# Patient Record
Sex: MALE | Race: OTHER | Employment: FULL TIME
[De-identification: names, ages, dates, MRNs, and addresses within clinical notes are randomized per-mention and may not be internally consistent; named-entity substitution may affect disease eponyms.]

---

## 2017-01-10 ENCOUNTER — OFFICE VISIT (OUTPATIENT)
Dept: FAMILY MEDICINE CLINIC | Facility: CLINIC | Age: 52
End: 2017-01-10

## 2017-01-10 VITALS
WEIGHT: 176.8 LBS | HEART RATE: 60 BPM | RESPIRATION RATE: 18 BRPM | TEMPERATURE: 98.5 F | SYSTOLIC BLOOD PRESSURE: 102 MMHG | BODY MASS INDEX: 32.34 KG/M2 | DIASTOLIC BLOOD PRESSURE: 70 MMHG

## 2017-01-10 DIAGNOSIS — E11.9 TYPE 2 DIABETES MELLITUS WITHOUT COMPLICATION, WITHOUT LONG-TERM CURRENT USE OF INSULIN (HCC): ICD-10-CM

## 2017-01-10 DIAGNOSIS — B34.9 VIRAL ILLNESS: Primary | ICD-10-CM

## 2017-01-10 DIAGNOSIS — J02.9 SORE THROAT: ICD-10-CM

## 2017-01-10 DIAGNOSIS — R05.9 COUGH: ICD-10-CM

## 2017-01-10 DIAGNOSIS — R09.81 CONGESTION OF NASAL SINUS: ICD-10-CM

## 2017-01-10 LAB — S PYO AG THROAT QL: NORMAL

## 2017-01-10 PROCEDURE — 99213 OFFICE O/P EST LOW 20 MIN: CPT | Performed by: FAMILY MEDICINE

## 2017-01-10 PROCEDURE — 87880 STREP A ASSAY W/OPTIC: CPT | Performed by: FAMILY MEDICINE

## 2017-01-10 RX ORDER — MELOXICAM 15 MG/1
TABLET ORAL
Refills: 1 | COMMUNITY
Start: 2016-12-20 | End: 2017-07-21 | Stop reason: ALTCHOICE

## 2017-01-10 RX ORDER — FLUTICASONE PROPIONATE 50 MCG
SPRAY, SUSPENSION (ML) NASAL
Qty: 1 BOTTLE | Refills: 0 | Status: SHIPPED | OUTPATIENT
Start: 2017-01-10 | End: 2018-08-14 | Stop reason: ALTCHOICE

## 2017-01-10 RX ORDER — ATORVASTATIN CALCIUM 10 MG/1
TABLET, FILM COATED ORAL
Qty: 30 TABLET | Refills: 3 | Status: SHIPPED | OUTPATIENT
Start: 2017-01-10 | End: 2017-05-03 | Stop reason: SDUPTHER

## 2017-01-10 RX ORDER — BENZONATATE 100 MG/1
CAPSULE ORAL
Qty: 30 CAPSULE | Refills: 1 | Status: SHIPPED | OUTPATIENT
Start: 2017-01-10 | End: 2017-07-21 | Stop reason: ALTCHOICE

## 2017-01-10 ASSESSMENT — ENCOUNTER SYMPTOMS
CHEST TIGHTNESS: 0
SORE THROAT: 1
COUGH: 1
RHINORRHEA: 1
SHORTNESS OF BREATH: 0
ABDOMINAL PAIN: 0

## 2017-02-03 ENCOUNTER — TELEPHONE (OUTPATIENT)
Dept: ORTHOPEDIC SURGERY | Facility: CLINIC | Age: 52
End: 2017-02-03

## 2017-02-07 RX ORDER — METHYLPREDNISOLONE 4 MG/1
TABLET ORAL
Qty: 1 KIT | Refills: 0 | Status: SHIPPED | OUTPATIENT
Start: 2017-02-07 | End: 2017-07-21 | Stop reason: ALTCHOICE

## 2017-03-08 ENCOUNTER — OFFICE VISIT (OUTPATIENT)
Dept: ORTHOPEDIC SURGERY | Facility: CLINIC | Age: 52
End: 2017-03-08

## 2017-03-08 DIAGNOSIS — G89.29 CHRONIC RIGHT SHOULDER PAIN: Primary | ICD-10-CM

## 2017-03-08 DIAGNOSIS — M25.511 CHRONIC RIGHT SHOULDER PAIN: Primary | ICD-10-CM

## 2017-03-08 DIAGNOSIS — M25.512 LEFT SHOULDER PAIN, UNSPECIFIED CHRONICITY: ICD-10-CM

## 2017-03-08 PROCEDURE — 99213 OFFICE O/P EST LOW 20 MIN: CPT | Performed by: ORTHOPAEDIC SURGERY

## 2017-03-08 PROCEDURE — 20610 DRAIN/INJ JOINT/BURSA W/O US: CPT | Performed by: ORTHOPAEDIC SURGERY

## 2017-03-08 RX ORDER — BETAMETHASONE SODIUM PHOSPHATE AND BETAMETHASONE ACETATE 3; 3 MG/ML; MG/ML
12 INJECTION, SUSPENSION INTRA-ARTICULAR; INTRALESIONAL; INTRAMUSCULAR; SOFT TISSUE ONCE
Status: COMPLETED | OUTPATIENT
Start: 2017-03-08 | End: 2017-03-08

## 2017-03-08 RX ORDER — BUPIVACAINE HYDROCHLORIDE 5 MG/ML
2 INJECTION, SOLUTION PERINEURAL ONCE
Status: COMPLETED | OUTPATIENT
Start: 2017-03-08 | End: 2017-03-08

## 2017-03-08 RX ORDER — LIDOCAINE HYDROCHLORIDE 10 MG/ML
2 INJECTION, SOLUTION EPIDURAL; INFILTRATION; INTRACAUDAL; PERINEURAL ONCE
Status: COMPLETED | OUTPATIENT
Start: 2017-03-08 | End: 2017-03-08

## 2017-03-08 RX ADMIN — BUPIVACAINE HYDROCHLORIDE 10 MG: 5 INJECTION, SOLUTION PERINEURAL at 16:20

## 2017-03-08 RX ADMIN — BETAMETHASONE SODIUM PHOSPHATE AND BETAMETHASONE ACETATE 12 MG: 3; 3 INJECTION, SUSPENSION INTRA-ARTICULAR; INTRALESIONAL; INTRAMUSCULAR; SOFT TISSUE at 16:19

## 2017-03-08 RX ADMIN — LIDOCAINE HYDROCHLORIDE 2 ML: 10 INJECTION, SOLUTION EPIDURAL; INFILTRATION; INTRACAUDAL; PERINEURAL at 16:20

## 2017-05-04 ENCOUNTER — OFFICE VISIT (OUTPATIENT)
Dept: FAMILY MEDICINE CLINIC | Age: 52
End: 2017-05-04
Payer: COMMERCIAL

## 2017-05-04 VITALS
WEIGHT: 180 LBS | HEIGHT: 62 IN | DIASTOLIC BLOOD PRESSURE: 70 MMHG | SYSTOLIC BLOOD PRESSURE: 120 MMHG | BODY MASS INDEX: 33.13 KG/M2

## 2017-05-04 DIAGNOSIS — E66.9 OBESITY (BMI 30.0-34.9): ICD-10-CM

## 2017-05-04 DIAGNOSIS — E78.2 MIXED HYPERLIPIDEMIA: ICD-10-CM

## 2017-05-04 DIAGNOSIS — E11.9 TYPE 2 DIABETES MELLITUS WITHOUT COMPLICATION, WITHOUT LONG-TERM CURRENT USE OF INSULIN (HCC): Primary | ICD-10-CM

## 2017-05-04 LAB — HBA1C MFR BLD: 6.1 %

## 2017-05-04 PROCEDURE — 83036 HEMOGLOBIN GLYCOSYLATED A1C: CPT | Performed by: FAMILY MEDICINE

## 2017-05-04 PROCEDURE — 99214 OFFICE O/P EST MOD 30 MIN: CPT | Performed by: FAMILY MEDICINE

## 2017-05-04 ASSESSMENT — PATIENT HEALTH QUESTIONNAIRE - PHQ9
SUM OF ALL RESPONSES TO PHQ9 QUESTIONS 1 & 2: 0
1. LITTLE INTEREST OR PLEASURE IN DOING THINGS: 0
SUM OF ALL RESPONSES TO PHQ QUESTIONS 1-9: 0
2. FEELING DOWN, DEPRESSED OR HOPELESS: 0

## 2017-05-04 ASSESSMENT — ENCOUNTER SYMPTOMS
BLOOD IN STOOL: 0
ABDOMINAL PAIN: 0
SHORTNESS OF BREATH: 0
CHEST TIGHTNESS: 0

## 2017-05-20 LAB
ALT SERPL-CCNC: 27 U/L
AST SERPL-CCNC: 25 U/L
BUN BLDV-MCNC: 24 MG/DL
CALCIUM SERPL-MCNC: 8.8 MG/DL
CHLORIDE BLD-SCNC: 105 MMOL/L
CHOLESTEROL, TOTAL: 144 MG/DL
CHOLESTEROL/HDL RATIO: 3.1
CO2: 26 MMOL/L
CREAT SERPL-MCNC: 0.88 MG/DL
GFR CALCULATED: >60
GLUCOSE BLD-MCNC: 106 MG/DL
HDLC SERPL-MCNC: 47 MG/DL (ref 35–70)
LDL CHOLESTEROL CALCULATED: 68 MG/DL (ref 0–160)
POTASSIUM SERPL-SCNC: 4.1 MMOL/L
SODIUM BLD-SCNC: 138 MMOL/L
TRIGL SERPL-MCNC: 143 MG/DL
VLDLC SERPL CALC-MCNC: 29 MG/DL

## 2017-05-22 DIAGNOSIS — E11.9 TYPE 2 DIABETES MELLITUS WITHOUT COMPLICATION, WITHOUT LONG-TERM CURRENT USE OF INSULIN (HCC): ICD-10-CM

## 2017-05-22 DIAGNOSIS — E78.2 MIXED HYPERLIPIDEMIA: ICD-10-CM

## 2017-07-07 ENCOUNTER — TELEPHONE (OUTPATIENT)
Dept: PODIATRY | Age: 52
End: 2017-07-07

## 2017-07-21 ENCOUNTER — HOSPITAL ENCOUNTER (OUTPATIENT)
Dept: PREADMISSION TESTING | Age: 52
Discharge: HOME | End: 2017-07-21
Payer: COMMERCIAL

## 2017-07-21 VITALS
TEMPERATURE: 98.1 F | WEIGHT: 181 LBS | BODY MASS INDEX: 35.53 KG/M2 | RESPIRATION RATE: 16 BRPM | OXYGEN SATURATION: 95 % | HEART RATE: 60 BPM | SYSTOLIC BLOOD PRESSURE: 104 MMHG | DIASTOLIC BLOOD PRESSURE: 65 MMHG | HEIGHT: 60 IN

## 2017-07-21 LAB
ABSOLUTE EOS #: 0.1 K/UL (ref 0–0.4)
ABSOLUTE LYMPH #: 2.3 K/UL (ref 1–4.8)
ABSOLUTE MONO #: 0.7 K/UL (ref 0.1–1.3)
ANION GAP SERPL CALCULATED.3IONS-SCNC: 14 MMOL/L (ref 9–17)
BASOPHILS # BLD: 1 %
BASOPHILS ABSOLUTE: 0.1 K/UL (ref 0–0.2)
BUN BLDV-MCNC: 22 MG/DL (ref 6–20)
BUN/CREAT BLD: ABNORMAL (ref 9–20)
CALCIUM SERPL-MCNC: 9.1 MG/DL (ref 8.6–10.4)
CHLORIDE BLD-SCNC: 99 MMOL/L (ref 98–107)
CO2: 24 MMOL/L (ref 20–31)
CREAT SERPL-MCNC: 0.94 MG/DL (ref 0.7–1.2)
DIFFERENTIAL TYPE: NORMAL
EOSINOPHILS RELATIVE PERCENT: 1 %
GFR AFRICAN AMERICAN: >60 ML/MIN
GFR NON-AFRICAN AMERICAN: >60 ML/MIN
GFR SERPL CREATININE-BSD FRML MDRD: ABNORMAL ML/MIN/{1.73_M2}
GFR SERPL CREATININE-BSD FRML MDRD: ABNORMAL ML/MIN/{1.73_M2}
GLUCOSE BLD-MCNC: 89 MG/DL (ref 70–99)
HCT VFR BLD CALC: 45.6 % (ref 41–53)
HEMOGLOBIN: 15.4 G/DL (ref 13.5–17.5)
LYMPHOCYTES # BLD: 30 %
MCH RBC QN AUTO: 31 PG (ref 26–34)
MCHC RBC AUTO-ENTMCNC: 33.9 G/DL (ref 31–37)
MCV RBC AUTO: 91.6 FL (ref 80–100)
MONOCYTES # BLD: 9 %
PDW BLD-RTO: 13.3 % (ref 11.5–14.9)
PLATELET # BLD: 296 K/UL (ref 150–450)
PLATELET ESTIMATE: NORMAL
PMV BLD AUTO: 7.8 FL (ref 6–12)
POTASSIUM SERPL-SCNC: 4.3 MMOL/L (ref 3.7–5.3)
RBC # BLD: 4.98 M/UL (ref 4.5–5.9)
RBC # BLD: NORMAL 10*6/UL
SEG NEUTROPHILS: 59 %
SEGMENTED NEUTROPHILS ABSOLUTE COUNT: 4.4 K/UL (ref 1.3–9.1)
SODIUM BLD-SCNC: 137 MMOL/L (ref 135–144)
WBC # BLD: 7.5 K/UL (ref 3.5–11)
WBC # BLD: NORMAL 10*3/UL

## 2017-07-21 PROCEDURE — 80048 BASIC METABOLIC PNL TOTAL CA: CPT

## 2017-07-21 PROCEDURE — 85025 COMPLETE CBC W/AUTO DIFF WBC: CPT

## 2017-07-21 PROCEDURE — 36415 COLL VENOUS BLD VENIPUNCTURE: CPT

## 2017-07-21 PROCEDURE — 93005 ELECTROCARDIOGRAM TRACING: CPT

## 2017-07-22 ENCOUNTER — ANESTHESIA EVENT (OUTPATIENT)
Dept: OPERATING ROOM | Age: 52
End: 2017-07-22
Payer: COMMERCIAL

## 2017-07-22 LAB
EKG ATRIAL RATE: 54 BPM
EKG P AXIS: -8 DEGREES
EKG P-R INTERVAL: 166 MS
EKG Q-T INTERVAL: 416 MS
EKG QRS DURATION: 84 MS
EKG QTC CALCULATION (BAZETT): 394 MS
EKG R AXIS: 73 DEGREES
EKG T AXIS: 58 DEGREES
EKG VENTRICULAR RATE: 54 BPM

## 2017-07-22 RX ORDER — SODIUM CHLORIDE 0.9 % (FLUSH) 0.9 %
10 SYRINGE (ML) INJECTION EVERY 12 HOURS SCHEDULED
Status: CANCELLED | OUTPATIENT
Start: 2017-07-22

## 2017-07-22 RX ORDER — SODIUM CHLORIDE 0.9 % (FLUSH) 0.9 %
10 SYRINGE (ML) INJECTION PRN
Status: CANCELLED | OUTPATIENT
Start: 2017-07-22

## 2017-07-22 RX ORDER — SODIUM CHLORIDE 9 MG/ML
INJECTION, SOLUTION INTRAVENOUS CONTINUOUS
Status: CANCELLED | OUTPATIENT
Start: 2017-07-22

## 2017-07-22 RX ORDER — LIDOCAINE HYDROCHLORIDE 10 MG/ML
1 INJECTION, SOLUTION EPIDURAL; INFILTRATION; INTRACAUDAL; PERINEURAL
Status: CANCELLED | OUTPATIENT
Start: 2017-07-22 | End: 2017-07-22

## 2017-07-24 ENCOUNTER — TELEPHONE (OUTPATIENT)
Dept: PODIATRY | Age: 52
End: 2017-07-24

## 2017-08-02 ENCOUNTER — HOSPITAL ENCOUNTER (OUTPATIENT)
Age: 52
Setting detail: OUTPATIENT SURGERY
Discharge: HOME | End: 2017-08-02
Attending: PODIATRIST | Admitting: PODIATRIST
Payer: COMMERCIAL

## 2017-08-02 ENCOUNTER — ANESTHESIA (OUTPATIENT)
Dept: OPERATING ROOM | Age: 52
End: 2017-08-02
Payer: COMMERCIAL

## 2017-08-02 VITALS
SYSTOLIC BLOOD PRESSURE: 120 MMHG | TEMPERATURE: 97 F | BODY MASS INDEX: 35.53 KG/M2 | HEART RATE: 49 BPM | RESPIRATION RATE: 13 BRPM | DIASTOLIC BLOOD PRESSURE: 69 MMHG | WEIGHT: 181 LBS | OXYGEN SATURATION: 98 % | HEIGHT: 60 IN

## 2017-08-02 VITALS — OXYGEN SATURATION: 98 % | SYSTOLIC BLOOD PRESSURE: 98 MMHG | DIASTOLIC BLOOD PRESSURE: 57 MMHG

## 2017-08-02 LAB
GLUCOSE BLD-MCNC: 113 MG/DL (ref 75–110)
GLUCOSE BLD-MCNC: 114 MG/DL (ref 75–110)

## 2017-08-02 PROCEDURE — 64726 DCMPRN PLANTAR DIGITAL NERVE: CPT | Performed by: PODIATRIST

## 2017-08-02 PROCEDURE — 3700000000 HC ANESTHESIA ATTENDED CARE: Performed by: PODIATRIST

## 2017-08-02 PROCEDURE — 2500000003 HC RX 250 WO HCPCS: Performed by: PODIATRIST

## 2017-08-02 PROCEDURE — 6360000002 HC RX W HCPCS

## 2017-08-02 PROCEDURE — 7100000031 HC ASPR PHASE II RECOVERY - ADDTL 15 MIN: Performed by: PODIATRIST

## 2017-08-02 PROCEDURE — 2500000003 HC RX 250 WO HCPCS

## 2017-08-02 PROCEDURE — 2580000003 HC RX 258

## 2017-08-02 PROCEDURE — 3700000001 HC ADD 15 MINUTES (ANESTHESIA): Performed by: PODIATRIST

## 2017-08-02 PROCEDURE — 6360000002 HC RX W HCPCS: Performed by: PODIATRIST

## 2017-08-02 PROCEDURE — 2580000003 HC RX 258: Performed by: ANESTHESIOLOGY

## 2017-08-02 PROCEDURE — 3600000002 HC SURGERY LEVEL 2 BASE: Performed by: PODIATRIST

## 2017-08-02 PROCEDURE — 7100000000 HC PACU RECOVERY - FIRST 15 MIN: Performed by: PODIATRIST

## 2017-08-02 PROCEDURE — 7100000001 HC PACU RECOVERY - ADDTL 15 MIN: Performed by: PODIATRIST

## 2017-08-02 PROCEDURE — 82947 ASSAY GLUCOSE BLOOD QUANT: CPT

## 2017-08-02 PROCEDURE — 3600000012 HC SURGERY LEVEL 2 ADDTL 15MIN: Performed by: PODIATRIST

## 2017-08-02 PROCEDURE — 7100000030 HC ASPR PHASE II RECOVERY - FIRST 15 MIN: Performed by: PODIATRIST

## 2017-08-02 RX ORDER — DOXYCYCLINE HYCLATE 100 MG
100 TABLET ORAL DAILY
Qty: 7 TABLET | Refills: 0 | Status: SHIPPED | OUTPATIENT
Start: 2017-08-02 | End: 2017-08-09

## 2017-08-02 RX ORDER — DEXAMETHASONE SODIUM PHOSPHATE 4 MG/ML
INJECTION, SOLUTION INTRA-ARTICULAR; INTRALESIONAL; INTRAMUSCULAR; INTRAVENOUS; SOFT TISSUE PRN
Status: DISCONTINUED | OUTPATIENT
Start: 2017-08-02 | End: 2017-08-02 | Stop reason: HOSPADM

## 2017-08-02 RX ORDER — OXYCODONE HYDROCHLORIDE AND ACETAMINOPHEN 5; 325 MG/1; MG/1
1 TABLET ORAL EVERY 6 HOURS PRN
Qty: 30 TABLET | Refills: 0 | Status: SHIPPED | OUTPATIENT
Start: 2017-08-02 | End: 2017-09-01 | Stop reason: ALTCHOICE

## 2017-08-02 RX ORDER — OXYCODONE HYDROCHLORIDE AND ACETAMINOPHEN 5; 325 MG/1; MG/1
2 TABLET ORAL PRN
Status: DISCONTINUED | OUTPATIENT
Start: 2017-08-02 | End: 2017-08-02 | Stop reason: HOSPADM

## 2017-08-02 RX ORDER — KETAMINE HYDROCHLORIDE 50 MG/ML
INJECTION, SOLUTION, CONCENTRATE INTRAMUSCULAR; INTRAVENOUS PRN
Status: DISCONTINUED | OUTPATIENT
Start: 2017-08-02 | End: 2017-08-02 | Stop reason: SDUPTHER

## 2017-08-02 RX ORDER — SODIUM CHLORIDE 0.9 % (FLUSH) 0.9 %
10 SYRINGE (ML) INJECTION EVERY 12 HOURS SCHEDULED
Status: DISCONTINUED | OUTPATIENT
Start: 2017-08-02 | End: 2017-08-02 | Stop reason: HOSPADM

## 2017-08-02 RX ORDER — SODIUM CHLORIDE 9 MG/ML
INJECTION, SOLUTION INTRAVENOUS CONTINUOUS
Status: DISCONTINUED | OUTPATIENT
Start: 2017-08-02 | End: 2017-08-02 | Stop reason: HOSPADM

## 2017-08-02 RX ORDER — MEPERIDINE HYDROCHLORIDE 25 MG/ML
12.5 INJECTION INTRAMUSCULAR; INTRAVENOUS; SUBCUTANEOUS EVERY 5 MIN PRN
Status: DISCONTINUED | OUTPATIENT
Start: 2017-08-02 | End: 2017-08-02 | Stop reason: HOSPADM

## 2017-08-02 RX ORDER — BUPIVACAINE HYDROCHLORIDE 5 MG/ML
INJECTION, SOLUTION EPIDURAL; INTRACAUDAL PRN
Status: DISCONTINUED | OUTPATIENT
Start: 2017-08-02 | End: 2017-08-02 | Stop reason: HOSPADM

## 2017-08-02 RX ORDER — PROMETHAZINE HYDROCHLORIDE 25 MG/ML
6.25 INJECTION, SOLUTION INTRAMUSCULAR; INTRAVENOUS
Status: DISCONTINUED | OUTPATIENT
Start: 2017-08-02 | End: 2017-08-02 | Stop reason: HOSPADM

## 2017-08-02 RX ORDER — LIDOCAINE HYDROCHLORIDE 10 MG/ML
INJECTION, SOLUTION INFILTRATION; PERINEURAL PRN
Status: DISCONTINUED | OUTPATIENT
Start: 2017-08-02 | End: 2017-08-02 | Stop reason: HOSPADM

## 2017-08-02 RX ORDER — LIDOCAINE HYDROCHLORIDE 10 MG/ML
1 INJECTION, SOLUTION EPIDURAL; INFILTRATION; INTRACAUDAL; PERINEURAL
Status: DISCONTINUED | OUTPATIENT
Start: 2017-08-02 | End: 2017-08-02 | Stop reason: HOSPADM

## 2017-08-02 RX ORDER — FENTANYL CITRATE 50 UG/ML
INJECTION, SOLUTION INTRAMUSCULAR; INTRAVENOUS PRN
Status: DISCONTINUED | OUTPATIENT
Start: 2017-08-02 | End: 2017-08-02 | Stop reason: SDUPTHER

## 2017-08-02 RX ORDER — SODIUM CHLORIDE 0.9 % (FLUSH) 0.9 %
10 SYRINGE (ML) INJECTION PRN
Status: DISCONTINUED | OUTPATIENT
Start: 2017-08-02 | End: 2017-08-02 | Stop reason: HOSPADM

## 2017-08-02 RX ORDER — LABETALOL HYDROCHLORIDE 5 MG/ML
5 INJECTION, SOLUTION INTRAVENOUS EVERY 10 MIN PRN
Status: DISCONTINUED | OUTPATIENT
Start: 2017-08-02 | End: 2017-08-02 | Stop reason: HOSPADM

## 2017-08-02 RX ORDER — MIDAZOLAM HYDROCHLORIDE 1 MG/ML
INJECTION INTRAMUSCULAR; INTRAVENOUS PRN
Status: DISCONTINUED | OUTPATIENT
Start: 2017-08-02 | End: 2017-08-02 | Stop reason: SDUPTHER

## 2017-08-02 RX ORDER — PROPOFOL 10 MG/ML
INJECTION, EMULSION INTRAVENOUS CONTINUOUS PRN
Status: DISCONTINUED | OUTPATIENT
Start: 2017-08-02 | End: 2017-08-02 | Stop reason: SDUPTHER

## 2017-08-02 RX ORDER — OXYCODONE HYDROCHLORIDE AND ACETAMINOPHEN 5; 325 MG/1; MG/1
1 TABLET ORAL PRN
Status: DISCONTINUED | OUTPATIENT
Start: 2017-08-02 | End: 2017-08-02 | Stop reason: HOSPADM

## 2017-08-02 RX ORDER — SODIUM CHLORIDE, SODIUM LACTATE, POTASSIUM CHLORIDE, CALCIUM CHLORIDE 600; 310; 30; 20 MG/100ML; MG/100ML; MG/100ML; MG/100ML
INJECTION, SOLUTION INTRAVENOUS CONTINUOUS PRN
Status: DISCONTINUED | OUTPATIENT
Start: 2017-08-02 | End: 2017-08-02 | Stop reason: SDUPTHER

## 2017-08-02 RX ORDER — DIPHENHYDRAMINE HYDROCHLORIDE 50 MG/ML
12.5 INJECTION INTRAMUSCULAR; INTRAVENOUS
Status: DISCONTINUED | OUTPATIENT
Start: 2017-08-02 | End: 2017-08-02 | Stop reason: HOSPADM

## 2017-08-02 RX ADMIN — FENTANYL CITRATE 25 MCG: 50 INJECTION, SOLUTION INTRAMUSCULAR; INTRAVENOUS at 07:56

## 2017-08-02 ASSESSMENT — PAIN SCALES - GENERAL
PAINLEVEL_OUTOF10: 0

## 2017-08-02 ASSESSMENT — PAIN - FUNCTIONAL ASSESSMENT: PAIN_FUNCTIONAL_ASSESSMENT: 0-10

## 2017-08-07 ENCOUNTER — OFFICE VISIT (OUTPATIENT)
Dept: PODIATRY | Age: 52
End: 2017-08-07

## 2017-08-07 VITALS
DIASTOLIC BLOOD PRESSURE: 81 MMHG | BODY MASS INDEX: 35.53 KG/M2 | HEART RATE: 53 BPM | SYSTOLIC BLOOD PRESSURE: 132 MMHG | HEIGHT: 60 IN | WEIGHT: 181 LBS

## 2017-08-07 DIAGNOSIS — G57.81 NEUROMA DIGITAL NERVE, RIGHT: Primary | ICD-10-CM

## 2017-08-07 DIAGNOSIS — M79.671 RIGHT FOOT PAIN: ICD-10-CM

## 2017-08-07 DIAGNOSIS — R60.0 EDEMA OF RIGHT FOOT: ICD-10-CM

## 2017-08-07 PROCEDURE — 99024 POSTOP FOLLOW-UP VISIT: CPT | Performed by: PODIATRIST

## 2017-08-10 ASSESSMENT — ENCOUNTER SYMPTOMS
BACK PAIN: 0
SHORTNESS OF BREATH: 0
COLOR CHANGE: 0
DIARRHEA: 0
NAUSEA: 0

## 2017-08-18 ENCOUNTER — OFFICE VISIT (OUTPATIENT)
Dept: PODIATRY | Age: 52
End: 2017-08-18

## 2017-08-18 VITALS
WEIGHT: 180 LBS | SYSTOLIC BLOOD PRESSURE: 112 MMHG | BODY MASS INDEX: 35.34 KG/M2 | DIASTOLIC BLOOD PRESSURE: 72 MMHG | HEIGHT: 60 IN | HEART RATE: 57 BPM

## 2017-08-18 DIAGNOSIS — G57.81 NEUROMA DIGITAL NERVE, RIGHT: Primary | ICD-10-CM

## 2017-08-18 DIAGNOSIS — M79.671 RIGHT FOOT PAIN: ICD-10-CM

## 2017-08-18 DIAGNOSIS — R60.0 EDEMA OF RIGHT FOOT: ICD-10-CM

## 2017-08-18 PROCEDURE — 99024 POSTOP FOLLOW-UP VISIT: CPT | Performed by: PODIATRIST

## 2017-08-18 ASSESSMENT — ENCOUNTER SYMPTOMS
NAUSEA: 0
COLOR CHANGE: 0
DIARRHEA: 0
BACK PAIN: 0
SHORTNESS OF BREATH: 0

## 2017-09-01 ENCOUNTER — OFFICE VISIT (OUTPATIENT)
Dept: PODIATRY | Age: 52
End: 2017-09-01
Payer: COMMERCIAL

## 2017-09-01 VITALS
HEART RATE: 65 BPM | HEIGHT: 62 IN | DIASTOLIC BLOOD PRESSURE: 76 MMHG | WEIGHT: 180 LBS | SYSTOLIC BLOOD PRESSURE: 129 MMHG | BODY MASS INDEX: 33.13 KG/M2

## 2017-09-01 DIAGNOSIS — M79.671 RIGHT FOOT PAIN: ICD-10-CM

## 2017-09-01 DIAGNOSIS — R60.0 EDEMA OF RIGHT FOOT: ICD-10-CM

## 2017-09-01 DIAGNOSIS — G57.81 NEUROMA DIGITAL NERVE, RIGHT: Primary | ICD-10-CM

## 2017-09-01 PROCEDURE — 64455 NJX AA&/STRD PLTR COM DG NRV: CPT | Performed by: PODIATRIST

## 2017-09-01 PROCEDURE — 99024 POSTOP FOLLOW-UP VISIT: CPT | Performed by: PODIATRIST

## 2017-09-01 RX ORDER — BUPIVACAINE HYDROCHLORIDE 5 MG/ML
1 INJECTION, SOLUTION PERINEURAL ONCE
Status: COMPLETED | OUTPATIENT
Start: 2017-09-01 | End: 2017-09-01

## 2017-09-01 RX ADMIN — BUPIVACAINE HYDROCHLORIDE 5 MG: 5 INJECTION, SOLUTION PERINEURAL at 10:43

## 2017-09-01 ASSESSMENT — ENCOUNTER SYMPTOMS
DIARRHEA: 0
BACK PAIN: 0
NAUSEA: 0
SHORTNESS OF BREATH: 0
COLOR CHANGE: 0

## 2017-09-21 ENCOUNTER — OFFICE VISIT (OUTPATIENT)
Dept: PODIATRY | Age: 52
End: 2017-09-21

## 2017-09-21 VITALS
HEART RATE: 67 BPM | BODY MASS INDEX: 31.28 KG/M2 | SYSTOLIC BLOOD PRESSURE: 125 MMHG | HEIGHT: 62 IN | WEIGHT: 170 LBS | DIASTOLIC BLOOD PRESSURE: 74 MMHG

## 2017-09-21 DIAGNOSIS — G57.81 NEUROMA DIGITAL NERVE, RIGHT: Primary | ICD-10-CM

## 2017-09-21 DIAGNOSIS — M79.671 RIGHT FOOT PAIN: ICD-10-CM

## 2017-09-21 DIAGNOSIS — R60.0 EDEMA OF RIGHT FOOT: ICD-10-CM

## 2017-09-21 PROCEDURE — 99024 POSTOP FOLLOW-UP VISIT: CPT | Performed by: PODIATRIST

## 2017-09-27 ASSESSMENT — ENCOUNTER SYMPTOMS
BACK PAIN: 0
DIARRHEA: 0
NAUSEA: 0
COLOR CHANGE: 0
SHORTNESS OF BREATH: 0

## 2017-11-03 ENCOUNTER — OFFICE VISIT (OUTPATIENT)
Dept: FAMILY MEDICINE CLINIC | Age: 52
End: 2017-11-03
Payer: COMMERCIAL

## 2017-11-03 VITALS
RESPIRATION RATE: 14 BRPM | WEIGHT: 178 LBS | SYSTOLIC BLOOD PRESSURE: 120 MMHG | BODY MASS INDEX: 32.56 KG/M2 | HEART RATE: 68 BPM | DIASTOLIC BLOOD PRESSURE: 80 MMHG

## 2017-11-03 DIAGNOSIS — E66.9 OBESITY (BMI 30.0-34.9): ICD-10-CM

## 2017-11-03 DIAGNOSIS — M79.671 BILATERAL FOOT PAIN: ICD-10-CM

## 2017-11-03 DIAGNOSIS — E78.2 MIXED HYPERLIPIDEMIA: ICD-10-CM

## 2017-11-03 DIAGNOSIS — E11.9 TYPE 2 DIABETES MELLITUS WITHOUT COMPLICATION, WITHOUT LONG-TERM CURRENT USE OF INSULIN (HCC): Primary | ICD-10-CM

## 2017-11-03 DIAGNOSIS — M79.672 BILATERAL FOOT PAIN: ICD-10-CM

## 2017-11-03 LAB
CREATININE URINE: 81.75 MG/DL
HBA1C MFR BLD: 6.2 %
MICROALBUMIN/CREAT 24H UR: <0.7 MG/G{CREAT}

## 2017-11-03 PROCEDURE — 83036 HEMOGLOBIN GLYCOSYLATED A1C: CPT | Performed by: FAMILY MEDICINE

## 2017-11-03 PROCEDURE — 99214 OFFICE O/P EST MOD 30 MIN: CPT | Performed by: FAMILY MEDICINE

## 2017-11-03 RX ORDER — NAPROXEN 500 MG/1
500 TABLET ORAL 2 TIMES DAILY PRN
Qty: 20 TABLET | Refills: 0 | Status: SHIPPED | OUTPATIENT
Start: 2017-11-03 | End: 2019-08-20

## 2017-11-03 ASSESSMENT — ENCOUNTER SYMPTOMS
CHEST TIGHTNESS: 0
SHORTNESS OF BREATH: 0
ABDOMINAL PAIN: 0
BLOOD IN STOOL: 0

## 2017-11-03 NOTE — PROGRESS NOTES
Subjective:      Patient ID: Erica Baker is a 45 y.o. male. Visit Information    Have you changed or started any medications since your last visit including any over-the-counter medicines, vitamins, or herbal medicines? no   Are you having any side effects from any of your medications? -  no  Have you stopped taking any of your medications? Is so, why? -  no    Have you seen any other physician or provider since your last visit? No  Have you had any other diagnostic tests since your last visit? No  Have you been seen in the emergency room and/or had an admission to a hospital since we last saw you? No  Have you had your routine dental cleaning in the past 6 months? no    Have you activated your [employer] account? If not, what are your barriers? No     Patient Care Team:  Erik Rodrigez MD as PCP - General (Family Medicine)  Armen Stovall MD as Consulting Physician (Gastroenterology)    Medical History Review  Past Medical, Family, and Social History reviewed and does not contribute to the patient presenting condition    Health Maintenance   Topic Date Due    Hepatitis C screen  1965    Diabetic retinal exam  10/12/1973    HIV screen  10/12/1978    DTaP/Tdap/Td vaccine (1 - Tdap) 10/12/1982    Pneumococcal med risk (1 of 1 - PPSV23) 10/12/1982    Diabetic foot exam  06/09/2017    Diabetic microalbuminuria test  06/24/2017    Flu vaccine (1) 09/01/2017    Diabetic hemoglobin A1C test  05/04/2018    Lipid screen  05/20/2018    Colon cancer screen colonoscopy  12/02/2024     HPI  Patient is a 64-year-old obese  male who presents for diabetes and hyperlipidemia. Also states for about 2 months he has been having pain in his feet on and off and especially in the morning when he awakes. He states that after walking around for a while the pain in his feet resolves. He denies any chest pain, abdominal pain, shortness of breath, fever or chills.  He states that he is taking and tolerating his routine medication. He states he has a good appetite and remains active. His hemoglobin A1c today was 6.2. Review of Systems   Constitutional: Negative for chills and fever. HENT: Negative for congestion. Respiratory: Negative for chest tightness and shortness of breath. Cardiovascular: Negative for chest pain. Gastrointestinal: Negative for abdominal pain and blood in stool. Genitourinary: Negative for dysuria and hematuria. Skin: Negative for rash. Neurological: Negative for dizziness. Objective:   Physical Exam   Constitutional: He is oriented to person, place, and time. He appears well-developed and well-nourished. No distress. HENT:   Head: Normocephalic and atraumatic. Right Ear: Tympanic membrane, external ear and ear canal normal.   Left Ear: Tympanic membrane, external ear and ear canal normal.   Nose: Nose normal.   Mouth/Throat: Oropharynx is clear and moist.   Eyes: Conjunctivae are normal. Right eye exhibits no discharge. Left eye exhibits no discharge. No scleral icterus. Neck: Neck supple. Cardiovascular: Normal rate, regular rhythm, normal heart sounds and intact distal pulses. Pulmonary/Chest: Effort normal and breath sounds normal. No respiratory distress. He has no wheezes. Abdominal: Soft. He exhibits no distension. There is no tenderness. Musculoskeletal: He exhibits no edema. Right foot: There is tenderness (plantar aspect of foot). Left foot: There is tenderness (plantar aspectof foot). Neurological: He is alert and oriented to person, place, and time. Skin: Skin is warm and dry. No rash noted. Psychiatric: He has a normal mood and affect. His behavior is normal.   Nursing note and vitals reviewed. Assessment:      1.  Type 2 diabetes mellitus without complication, without long-term current use of insulin ([facility])  POCT glycosylated hemoglobin (Hb A1C)    Microalbumin, Ur    ALT    AST    Basic Metabolic Panel    Lipid

## 2017-11-10 LAB
ALT SERPL-CCNC: 46 U/L
AST SERPL-CCNC: 35 U/L
BUN BLDV-MCNC: 16 MG/DL
CALCIUM SERPL-MCNC: 9.2 MG/DL
CHLORIDE BLD-SCNC: 102 MMOL/L
CHOLESTEROL, TOTAL: 154 MG/DL
CHOLESTEROL/HDL RATIO: 3
CO2: 25 MMOL/L
CREAT SERPL-MCNC: 0.9 MG/DL
GFR CALCULATED: >60
GLUCOSE BLD-MCNC: 125 MG/DL
HDLC SERPL-MCNC: 51 MG/DL (ref 35–70)
LDL CHOLESTEROL CALCULATED: 86 MG/DL (ref 0–160)
MAGNESIUM: 2.2 MG/DL
POTASSIUM SERPL-SCNC: 4.2 MMOL/L
SODIUM BLD-SCNC: 135 MMOL/L
TRIGL SERPL-MCNC: 84 MG/DL
VLDLC SERPL CALC-MCNC: 17 MG/DL

## 2017-11-13 DIAGNOSIS — E11.9 TYPE 2 DIABETES MELLITUS WITHOUT COMPLICATION, WITHOUT LONG-TERM CURRENT USE OF INSULIN (HCC): ICD-10-CM

## 2017-11-13 DIAGNOSIS — E78.2 MIXED HYPERLIPIDEMIA: ICD-10-CM

## 2017-11-22 RX ORDER — ATORVASTATIN CALCIUM 10 MG/1
TABLET, FILM COATED ORAL
Qty: 30 TABLET | Refills: 2 | Status: SHIPPED | OUTPATIENT
Start: 2017-11-22 | End: 2018-02-19 | Stop reason: SDUPTHER

## 2017-11-22 NOTE — TELEPHONE ENCOUNTER
Next Visit Date:  Future Appointments  Date Time Provider Gianni Lizet   3/2/2018 4:30 PM Otoniel Macario MD 1957 [facility] Maintenance   Topic Date Due    Hepatitis C screen  1965    Diabetic retinal exam  10/12/1973    HIV screen  10/12/1978    DTaP/Tdap/Td vaccine (1 - Tdap) 10/12/1982    Pneumococcal med risk (1 of 1 - PPSV23) 10/12/1982    Diabetic foot exam  06/09/2017    Flu vaccine (1) 09/01/2017    Diabetic hemoglobin A1C test  11/03/2018    Diabetic microalbuminuria test  11/03/2018    Lipid screen  11/10/2018    Colon cancer screen colonoscopy  12/02/2024       Hemoglobin A1C (%)   Date Value   11/03/2017 6.2   05/04/2017 6.1   09/16/2016 5.9             ( goal A1C is < 7)   No results found for: LABMICR  LDL Calculated (mg/dL)   Date Value   11/10/2017 86       (goal LDL is <100)   AST (U/L)   Date Value   11/10/2017 35     ALT (U/L)   Date Value   11/10/2017 46     BUN (mg/dL)   Date Value   11/10/2017 16     BP Readings from Last 3 Encounters:   11/03/17 120/80   09/21/17 125/74   09/01/17 129/76          (goal 120/80)    All Future Testing planned in [facility]              Patient Active Problem List:     Hyperlipidemia     Benign prostatic hyperplasia     Poor compliance     Epigastric pain     Diverticulosis of colon     Esophagitis     Gastritis     Obesity (BMI 30.0-34. 9)     Type 2 diabetes mellitus without complication, without long-term current use of insulin (HCC)     Plantar neuroma of right foot     Right foot pain

## 2018-02-19 RX ORDER — ATORVASTATIN CALCIUM 10 MG/1
TABLET, FILM COATED ORAL
Qty: 90 TABLET | Refills: 1 | Status: SHIPPED | OUTPATIENT
Start: 2018-02-19 | End: 2018-07-19 | Stop reason: SDUPTHER

## 2018-02-19 NOTE — TELEPHONE ENCOUNTER
Next Visit Date:  Future Appointments  Date Time Provider Gianni Hinds   3/2/2018 4:30 PM Candice Piña MD 1957 [facility] Maintenance   Topic Date Due    Hepatitis C screen  1965    Diabetic retinal exam  10/12/1973    HIV screen  10/12/1978    DTaP/Tdap/Td vaccine (1 - Tdap) 10/12/1982    Pneumococcal med risk (1 of 1 - PPSV23) 10/12/1982    Diabetic foot exam  06/09/2017    Flu vaccine (1) 09/01/2017    A1C test (Diabetic or Prediabetic)  11/03/2018    Diabetic microalbuminuria test  11/03/2018    Lipid screen  11/10/2018    Colon cancer screen colonoscopy  12/02/2024       Hemoglobin A1C (%)   Date Value   11/03/2017 6.2   05/04/2017 6.1   09/16/2016 5.9             ( goal A1C is < 7)   No results found for: LABMICR  LDL Calculated (mg/dL)   Date Value   11/10/2017 86       (goal LDL is <100)   AST (U/L)   Date Value   11/10/2017 35     ALT (U/L)   Date Value   11/10/2017 46     BUN (mg/dL)   Date Value   11/10/2017 16     BP Readings from Last 3 Encounters:   11/03/17 120/80   09/21/17 125/74   09/01/17 129/76          (goal 120/80)    All Future Testing planned in [facility]              Patient Active Problem List:     Hyperlipidemia     Benign prostatic hyperplasia     Poor compliance     Epigastric pain     Diverticulosis of colon     Esophagitis     Gastritis     Obesity (BMI 30.0-34. 9)     Type 2 diabetes mellitus without complication, without long-term current use of insulin (HCC)     Plantar neuroma of right foot     Right foot pain

## 2018-03-02 ENCOUNTER — OFFICE VISIT (OUTPATIENT)
Dept: FAMILY MEDICINE CLINIC | Age: 53
End: 2018-03-02
Payer: COMMERCIAL

## 2018-03-02 VITALS
WEIGHT: 177 LBS | RESPIRATION RATE: 16 BRPM | HEART RATE: 60 BPM | BODY MASS INDEX: 32.37 KG/M2 | DIASTOLIC BLOOD PRESSURE: 70 MMHG | SYSTOLIC BLOOD PRESSURE: 110 MMHG

## 2018-03-02 DIAGNOSIS — E78.2 MIXED HYPERLIPIDEMIA: ICD-10-CM

## 2018-03-02 DIAGNOSIS — E11.9 TYPE 2 DIABETES MELLITUS WITHOUT COMPLICATION, WITHOUT LONG-TERM CURRENT USE OF INSULIN (HCC): Primary | ICD-10-CM

## 2018-03-02 DIAGNOSIS — E66.9 OBESITY (BMI 30.0-34.9): ICD-10-CM

## 2018-03-02 DIAGNOSIS — Z12.5 SCREENING PSA (PROSTATE SPECIFIC ANTIGEN): ICD-10-CM

## 2018-03-02 PROCEDURE — 99214 OFFICE O/P EST MOD 30 MIN: CPT | Performed by: FAMILY MEDICINE

## 2018-03-02 ASSESSMENT — ENCOUNTER SYMPTOMS
SHORTNESS OF BREATH: 0
ABDOMINAL PAIN: 0
CHEST TIGHTNESS: 0
BLOOD IN STOOL: 0

## 2018-03-24 LAB
ALT SERPL-CCNC: 34 U/L
AST SERPL-CCNC: 26 U/L
AVERAGE GLUCOSE: 137
BUN BLDV-MCNC: 16 MG/DL
CALCIUM SERPL-MCNC: 9.1 MG/DL
CHLORIDE BLD-SCNC: 105 MMOL/L
CHOLESTEROL, TOTAL: 139 MG/DL
CHOLESTEROL/HDL RATIO: 3.2
CO2: 25 MMOL/L
CREAT SERPL-MCNC: 0.84 MG/DL
GFR CALCULATED: >60
GLUCOSE BLD-MCNC: 123 MG/DL
HBA1C MFR BLD: 6.4 %
HDLC SERPL-MCNC: 43 MG/DL (ref 35–70)
LDL CHOLESTEROL CALCULATED: 82 MG/DL (ref 0–160)
POTASSIUM SERPL-SCNC: 4.1 MMOL/L
PROSTATE SPECIFIC ANTIGEN: 1.08 NG/ML
SODIUM BLD-SCNC: 137 MMOL/L
TRIGL SERPL-MCNC: 70 MG/DL
VLDLC SERPL CALC-MCNC: 14 MG/DL

## 2018-03-26 DIAGNOSIS — E78.2 MIXED HYPERLIPIDEMIA: ICD-10-CM

## 2018-03-26 DIAGNOSIS — E11.9 TYPE 2 DIABETES MELLITUS WITHOUT COMPLICATION, WITHOUT LONG-TERM CURRENT USE OF INSULIN (HCC): ICD-10-CM

## 2018-03-26 DIAGNOSIS — Z12.5 SCREENING PSA (PROSTATE SPECIFIC ANTIGEN): ICD-10-CM

## 2018-06-11 ENCOUNTER — TELEPHONE (OUTPATIENT)
Dept: ORTHOPEDIC SURGERY | Age: 53
End: 2018-06-11

## 2018-06-27 ENCOUNTER — OFFICE VISIT (OUTPATIENT)
Dept: ORTHOPEDIC SURGERY | Age: 53
End: 2018-06-27
Payer: COMMERCIAL

## 2018-06-27 DIAGNOSIS — Z98.890 H/O ROTATOR CUFF SURGERY: ICD-10-CM

## 2018-06-27 DIAGNOSIS — M25.511 CHRONIC RIGHT SHOULDER PAIN: Primary | ICD-10-CM

## 2018-06-27 DIAGNOSIS — G89.29 CHRONIC RIGHT SHOULDER PAIN: Primary | ICD-10-CM

## 2018-06-27 PROCEDURE — 99213 OFFICE O/P EST LOW 20 MIN: CPT | Performed by: ORTHOPAEDIC SURGERY

## 2018-06-27 PROCEDURE — 20610 DRAIN/INJ JOINT/BURSA W/O US: CPT | Performed by: ORTHOPAEDIC SURGERY

## 2018-06-27 RX ORDER — BUPIVACAINE HYDROCHLORIDE 5 MG/ML
2 INJECTION, SOLUTION PERINEURAL ONCE
Status: COMPLETED | OUTPATIENT
Start: 2018-06-27 | End: 2018-06-27

## 2018-06-27 RX ORDER — LIDOCAINE HYDROCHLORIDE 10 MG/ML
2 INJECTION, SOLUTION EPIDURAL; INFILTRATION; INTRACAUDAL; PERINEURAL ONCE
Status: COMPLETED | OUTPATIENT
Start: 2018-06-27 | End: 2018-06-27

## 2018-06-27 RX ORDER — BETAMETHASONE SODIUM PHOSPHATE AND BETAMETHASONE ACETATE 3; 3 MG/ML; MG/ML
12 INJECTION, SUSPENSION INTRA-ARTICULAR; INTRALESIONAL; INTRAMUSCULAR; SOFT TISSUE ONCE
Status: COMPLETED | OUTPATIENT
Start: 2018-06-27 | End: 2018-06-27

## 2018-06-27 RX ADMIN — BETAMETHASONE SODIUM PHOSPHATE AND BETAMETHASONE ACETATE 12 MG: 3; 3 INJECTION, SUSPENSION INTRA-ARTICULAR; INTRALESIONAL; INTRAMUSCULAR; SOFT TISSUE at 15:41

## 2018-06-27 RX ADMIN — BUPIVACAINE HYDROCHLORIDE 10 MG: 5 INJECTION, SOLUTION PERINEURAL at 15:41

## 2018-06-27 RX ADMIN — LIDOCAINE HYDROCHLORIDE 2 ML: 10 INJECTION, SOLUTION EPIDURAL; INFILTRATION; INTRACAUDAL; PERINEURAL at 15:41

## 2018-07-09 ENCOUNTER — TELEPHONE (OUTPATIENT)
Dept: ORTHOPEDIC SURGERY | Age: 53
End: 2018-07-09

## 2018-07-09 DIAGNOSIS — M25.512 CHRONIC LEFT SHOULDER PAIN: Primary | ICD-10-CM

## 2018-07-09 DIAGNOSIS — G89.29 CHRONIC LEFT SHOULDER PAIN: Primary | ICD-10-CM

## 2018-07-09 NOTE — TELEPHONE ENCOUNTER
Informed pt. will need to check on prior auth. etc. He prefers [employer]. or [employer].

## 2018-07-09 NOTE — TELEPHONE ENCOUNTER
Seen 6-27-18 for left shoulder and injection. The injection didn't help and yesterday he was doing yard work and felt something pop. Now he can't raise is arm up at all. He would like you to order an MRI.

## 2018-07-16 ENCOUNTER — HOSPITAL ENCOUNTER (OUTPATIENT)
Dept: MRI IMAGING | Facility: CLINIC | Age: 53
Discharge: HOME | End: 2018-07-18
Payer: COMMERCIAL

## 2018-07-16 DIAGNOSIS — M25.512 CHRONIC LEFT SHOULDER PAIN: ICD-10-CM

## 2018-07-16 DIAGNOSIS — G89.29 CHRONIC LEFT SHOULDER PAIN: ICD-10-CM

## 2018-07-16 PROCEDURE — 73221 MRI JOINT UPR EXTREM W/O DYE: CPT

## 2018-07-19 ENCOUNTER — OFFICE VISIT (OUTPATIENT)
Dept: FAMILY MEDICINE CLINIC | Age: 53
End: 2018-07-19
Payer: COMMERCIAL

## 2018-07-19 VITALS
SYSTOLIC BLOOD PRESSURE: 110 MMHG | BODY MASS INDEX: 32.67 KG/M2 | RESPIRATION RATE: 20 BRPM | DIASTOLIC BLOOD PRESSURE: 82 MMHG | WEIGHT: 178.6 LBS | HEART RATE: 74 BPM

## 2018-07-19 DIAGNOSIS — E78.2 MIXED HYPERLIPIDEMIA: ICD-10-CM

## 2018-07-19 DIAGNOSIS — E66.9 OBESITY (BMI 30.0-34.9): ICD-10-CM

## 2018-07-19 DIAGNOSIS — E11.9 TYPE 2 DIABETES MELLITUS WITHOUT COMPLICATION, WITHOUT LONG-TERM CURRENT USE OF INSULIN (HCC): Primary | ICD-10-CM

## 2018-07-19 PROCEDURE — 99214 OFFICE O/P EST MOD 30 MIN: CPT | Performed by: FAMILY MEDICINE

## 2018-07-19 RX ORDER — ATORVASTATIN CALCIUM 10 MG/1
TABLET, FILM COATED ORAL
Qty: 90 TABLET | Refills: 1 | Status: SHIPPED | OUTPATIENT
Start: 2018-07-19 | End: 2018-07-23 | Stop reason: SDUPTHER

## 2018-07-19 ASSESSMENT — PATIENT HEALTH QUESTIONNAIRE - PHQ9
2. FEELING DOWN, DEPRESSED OR HOPELESS: 0
1. LITTLE INTEREST OR PLEASURE IN DOING THINGS: 0
SUM OF ALL RESPONSES TO PHQ9 QUESTIONS 1 & 2: 0
SUM OF ALL RESPONSES TO PHQ QUESTIONS 1-9: 0

## 2018-07-19 ASSESSMENT — ENCOUNTER SYMPTOMS
ABDOMINAL PAIN: 0
CHEST TIGHTNESS: 0
BLOOD IN STOOL: 0
SHORTNESS OF BREATH: 0

## 2018-07-21 LAB
ALT SERPL-CCNC: 42 U/L
AST SERPL-CCNC: 31 U/L
AVERAGE GLUCOSE: 143
BUN BLDV-MCNC: 19 MG/DL
CALCIUM SERPL-MCNC: 9.2 MG/DL
CHLORIDE BLD-SCNC: 104 MMOL/L
CHOLESTEROL, TOTAL: 243 MG/DL
CHOLESTEROL/HDL RATIO: 5
CO2: 24 MMOL/L
CREAT SERPL-MCNC: 0.99 MG/DL
GFR CALCULATED: >60
GLUCOSE BLD-MCNC: 132 MG/DL
HBA1C MFR BLD: 6.6 %
HDLC SERPL-MCNC: 49 MG/DL (ref 35–70)
LDL CHOLESTEROL CALCULATED: 168 MG/DL (ref 0–160)
POTASSIUM SERPL-SCNC: 4.3 MMOL/L
SODIUM BLD-SCNC: 137 MMOL/L
TRIGL SERPL-MCNC: 129 MG/DL
VLDLC SERPL CALC-MCNC: 26 MG/DL

## 2018-07-23 DIAGNOSIS — E78.2 MIXED HYPERLIPIDEMIA: ICD-10-CM

## 2018-07-23 DIAGNOSIS — E11.9 TYPE 2 DIABETES MELLITUS WITHOUT COMPLICATION, WITHOUT LONG-TERM CURRENT USE OF INSULIN (HCC): ICD-10-CM

## 2018-07-23 RX ORDER — ATORVASTATIN CALCIUM 20 MG/1
TABLET, FILM COATED ORAL
Qty: 90 TABLET | Refills: 1 | Status: SHIPPED | OUTPATIENT
Start: 2018-07-23 | End: 2019-02-21 | Stop reason: SDUPTHER

## 2018-07-24 ENCOUNTER — TELEPHONE (OUTPATIENT)
Dept: ORTHOPEDIC SURGERY | Age: 53
End: 2018-07-24

## 2018-07-27 ENCOUNTER — TELEPHONE (OUTPATIENT)
Dept: ORTHOPEDIC SURGERY | Age: 53
End: 2018-07-27

## 2018-07-27 NOTE — TELEPHONE ENCOUNTER
Pt initially wanted a follow up appt late in the day to discuss RTC surgery with you. He is in so much more pain now that he wants to move it up sooner. Am I okay to schedule sooner with any post op/ follow? ? Also anything you can give for pain? ?

## 2018-07-30 ENCOUNTER — OFFICE VISIT (OUTPATIENT)
Dept: ORTHOPEDIC SURGERY | Age: 53
End: 2018-07-30
Payer: COMMERCIAL

## 2018-07-30 DIAGNOSIS — M25.512 CHRONIC LEFT SHOULDER PAIN: Primary | ICD-10-CM

## 2018-07-30 DIAGNOSIS — G89.29 CHRONIC LEFT SHOULDER PAIN: Primary | ICD-10-CM

## 2018-07-30 PROCEDURE — 99213 OFFICE O/P EST LOW 20 MIN: CPT | Performed by: ORTHOPAEDIC SURGERY

## 2018-08-14 ENCOUNTER — HOSPITAL ENCOUNTER (OUTPATIENT)
Dept: PREADMISSION TESTING | Age: 53
Discharge: HOME | End: 2018-08-18
Payer: COMMERCIAL

## 2018-08-14 VITALS
BODY MASS INDEX: 32.57 KG/M2 | HEART RATE: 57 BPM | RESPIRATION RATE: 18 BRPM | SYSTOLIC BLOOD PRESSURE: 127 MMHG | TEMPERATURE: 98 F | WEIGHT: 177 LBS | OXYGEN SATURATION: 99 % | DIASTOLIC BLOOD PRESSURE: 64 MMHG | HEIGHT: 62 IN

## 2018-08-14 LAB
ABSOLUTE EOS #: 0.1 K/UL (ref 0–0.4)
ABSOLUTE IMMATURE GRANULOCYTE: ABNORMAL K/UL (ref 0–0.3)
ABSOLUTE LYMPH #: 1.6 K/UL (ref 1–4.8)
ABSOLUTE MONO #: 0.5 K/UL (ref 0.1–1.3)
ANION GAP SERPL CALCULATED.3IONS-SCNC: 12 MMOL/L (ref 9–17)
BASOPHILS # BLD: 1 % (ref 0–2)
BASOPHILS ABSOLUTE: 0.1 K/UL (ref 0–0.2)
BUN BLDV-MCNC: 16 MG/DL (ref 6–20)
BUN/CREAT BLD: ABNORMAL (ref 9–20)
CALCIUM SERPL-MCNC: 9.2 MG/DL (ref 8.6–10.4)
CHLORIDE BLD-SCNC: 102 MMOL/L (ref 98–107)
CO2: 24 MMOL/L (ref 20–31)
CREAT SERPL-MCNC: 0.74 MG/DL (ref 0.7–1.2)
DIFFERENTIAL TYPE: ABNORMAL
EKG ATRIAL RATE: 55 BPM
EKG P AXIS: 47 DEGREES
EKG P-R INTERVAL: 186 MS
EKG Q-T INTERVAL: 404 MS
EKG QRS DURATION: 84 MS
EKG QTC CALCULATION (BAZETT): 386 MS
EKG R AXIS: 80 DEGREES
EKG T AXIS: 63 DEGREES
EKG VENTRICULAR RATE: 55 BPM
EOSINOPHILS RELATIVE PERCENT: 2 % (ref 0–4)
GFR AFRICAN AMERICAN: >60 ML/MIN
GFR NON-AFRICAN AMERICAN: >60 ML/MIN
GFR SERPL CREATININE-BSD FRML MDRD: ABNORMAL ML/MIN/{1.73_M2}
GFR SERPL CREATININE-BSD FRML MDRD: ABNORMAL ML/MIN/{1.73_M2}
GLUCOSE BLD-MCNC: 128 MG/DL (ref 70–99)
HCT VFR BLD CALC: 43.6 % (ref 41–53)
HEMOGLOBIN: 15 G/DL (ref 13.5–17.5)
IMMATURE GRANULOCYTES: ABNORMAL %
LYMPHOCYTES # BLD: 29 % (ref 24–44)
MCH RBC QN AUTO: 31.5 PG (ref 26–34)
MCHC RBC AUTO-ENTMCNC: 34.5 G/DL (ref 31–37)
MCV RBC AUTO: 91.2 FL (ref 80–100)
MONOCYTES # BLD: 8 % (ref 1–7)
NRBC AUTOMATED: ABNORMAL PER 100 WBC
PDW BLD-RTO: 13.9 % (ref 11.5–14.9)
PLATELET # BLD: 266 K/UL (ref 150–450)
PLATELET ESTIMATE: ABNORMAL
PMV BLD AUTO: 7.6 FL (ref 6–12)
POTASSIUM SERPL-SCNC: 3.9 MMOL/L (ref 3.7–5.3)
RBC # BLD: 4.77 M/UL (ref 4.5–5.9)
RBC # BLD: ABNORMAL 10*6/UL
SEG NEUTROPHILS: 60 % (ref 36–66)
SEGMENTED NEUTROPHILS ABSOLUTE COUNT: 3.3 K/UL (ref 1.3–9.1)
SODIUM BLD-SCNC: 138 MMOL/L (ref 135–144)
WBC # BLD: 5.5 K/UL (ref 3.5–11)
WBC # BLD: ABNORMAL 10*3/UL

## 2018-08-14 PROCEDURE — 80048 BASIC METABOLIC PNL TOTAL CA: CPT

## 2018-08-14 PROCEDURE — 36415 COLL VENOUS BLD VENIPUNCTURE: CPT

## 2018-08-14 PROCEDURE — 85025 COMPLETE CBC W/AUTO DIFF WBC: CPT

## 2018-08-14 PROCEDURE — 93005 ELECTROCARDIOGRAM TRACING: CPT

## 2018-08-15 ENCOUNTER — ANESTHESIA EVENT (OUTPATIENT)
Dept: OPERATING ROOM | Age: 53
End: 2018-08-15
Payer: COMMERCIAL

## 2018-08-15 RX ORDER — SODIUM CHLORIDE 0.9 % (FLUSH) 0.9 %
10 SYRINGE (ML) INJECTION EVERY 12 HOURS SCHEDULED
Status: CANCELLED | OUTPATIENT
Start: 2018-08-15

## 2018-08-15 RX ORDER — LIDOCAINE HYDROCHLORIDE 10 MG/ML
1 INJECTION, SOLUTION EPIDURAL; INFILTRATION; INTRACAUDAL; PERINEURAL
Status: CANCELLED | OUTPATIENT
Start: 2018-08-15 | End: 2018-08-15

## 2018-08-15 RX ORDER — SODIUM CHLORIDE 0.9 % (FLUSH) 0.9 %
10 SYRINGE (ML) INJECTION PRN
Status: CANCELLED | OUTPATIENT
Start: 2018-08-15

## 2018-08-15 RX ORDER — SODIUM CHLORIDE 9 MG/ML
INJECTION, SOLUTION INTRAVENOUS CONTINUOUS
Status: CANCELLED | OUTPATIENT
Start: 2018-08-15

## 2018-08-28 ENCOUNTER — ANESTHESIA (OUTPATIENT)
Dept: OPERATING ROOM | Age: 53
End: 2018-08-28
Payer: COMMERCIAL

## 2018-08-28 ENCOUNTER — HOSPITAL ENCOUNTER (OUTPATIENT)
Age: 53
Setting detail: OUTPATIENT SURGERY
Discharge: HOME | End: 2018-08-28
Attending: ORTHOPAEDIC SURGERY | Admitting: ORTHOPAEDIC SURGERY
Payer: COMMERCIAL

## 2018-08-28 ENCOUNTER — HOSPITAL ENCOUNTER (EMERGENCY)
Age: 53
Discharge: HOME | End: 2018-08-28
Attending: EMERGENCY MEDICINE
Payer: COMMERCIAL

## 2018-08-28 ENCOUNTER — TELEPHONE (OUTPATIENT)
Dept: ORTHOPEDIC SURGERY | Age: 53
End: 2018-08-28

## 2018-08-28 ENCOUNTER — TELEPHONE (OUTPATIENT)
Dept: FAMILY MEDICINE CLINIC | Age: 53
End: 2018-08-28

## 2018-08-28 VITALS
WEIGHT: 180 LBS | SYSTOLIC BLOOD PRESSURE: 128 MMHG | HEIGHT: 62 IN | HEART RATE: 59 BPM | RESPIRATION RATE: 14 BRPM | BODY MASS INDEX: 33.13 KG/M2 | TEMPERATURE: 97.9 F | DIASTOLIC BLOOD PRESSURE: 76 MMHG | OXYGEN SATURATION: 97 %

## 2018-08-28 VITALS — DIASTOLIC BLOOD PRESSURE: 70 MMHG | OXYGEN SATURATION: 100 % | SYSTOLIC BLOOD PRESSURE: 123 MMHG | TEMPERATURE: 95.2 F

## 2018-08-28 VITALS
HEIGHT: 62 IN | OXYGEN SATURATION: 97 % | BODY MASS INDEX: 32.57 KG/M2 | SYSTOLIC BLOOD PRESSURE: 135 MMHG | DIASTOLIC BLOOD PRESSURE: 74 MMHG | WEIGHT: 177 LBS | HEART RATE: 57 BPM | TEMPERATURE: 97.7 F | RESPIRATION RATE: 13 BRPM

## 2018-08-28 VITALS
DIASTOLIC BLOOD PRESSURE: 81 MMHG | BODY MASS INDEX: 33.13 KG/M2 | RESPIRATION RATE: 15 BRPM | WEIGHT: 180 LBS | HEART RATE: 56 BPM | OXYGEN SATURATION: 96 % | HEIGHT: 62 IN | SYSTOLIC BLOOD PRESSURE: 137 MMHG | TEMPERATURE: 97.8 F

## 2018-08-28 DIAGNOSIS — M75.122 COMPLETE ROTATOR CUFF TEAR OF LEFT SHOULDER: Primary | ICD-10-CM

## 2018-08-28 DIAGNOSIS — R33.9 URINARY RETENTION: Primary | ICD-10-CM

## 2018-08-28 DIAGNOSIS — N99.89 POSTOPERATIVE URINARY RETENTION: Primary | ICD-10-CM

## 2018-08-28 DIAGNOSIS — R33.8 POSTOPERATIVE URINARY RETENTION: Primary | ICD-10-CM

## 2018-08-28 DIAGNOSIS — T83.9XXA FOLEY CATHETER PROBLEM, INITIAL ENCOUNTER (HCC): Primary | ICD-10-CM

## 2018-08-28 LAB
GLUCOSE BLD-MCNC: 103 MG/DL (ref 75–110)
GLUCOSE BLD-MCNC: 134 MG/DL (ref 75–110)

## 2018-08-28 PROCEDURE — 7100000000 HC PACU RECOVERY - FIRST 15 MIN: Performed by: ORTHOPAEDIC SURGERY

## 2018-08-28 PROCEDURE — 7100000031 HC ASPR PHASE II RECOVERY - ADDTL 15 MIN: Performed by: ORTHOPAEDIC SURGERY

## 2018-08-28 PROCEDURE — 3700000000 HC ANESTHESIA ATTENDED CARE: Performed by: ORTHOPAEDIC SURGERY

## 2018-08-28 PROCEDURE — 6370000000 HC RX 637 (ALT 250 FOR IP): Performed by: ANESTHESIOLOGY

## 2018-08-28 PROCEDURE — 2500000003 HC RX 250 WO HCPCS: Performed by: NURSE ANESTHETIST, CERTIFIED REGISTERED

## 2018-08-28 PROCEDURE — 3700000001 HC ADD 15 MINUTES (ANESTHESIA): Performed by: ORTHOPAEDIC SURGERY

## 2018-08-28 PROCEDURE — 2580000003 HC RX 258: Performed by: NURSE ANESTHETIST, CERTIFIED REGISTERED

## 2018-08-28 PROCEDURE — L3650 SO 8 ABD RESTRAINT PRE OTS: HCPCS | Performed by: ORTHOPAEDIC SURGERY

## 2018-08-28 PROCEDURE — 7100000001 HC PACU RECOVERY - ADDTL 15 MIN: Performed by: ORTHOPAEDIC SURGERY

## 2018-08-28 PROCEDURE — 3600000004 HC SURGERY LEVEL 4 BASE: Performed by: ORTHOPAEDIC SURGERY

## 2018-08-28 PROCEDURE — 6360000002 HC RX W HCPCS: Performed by: NURSE ANESTHETIST, CERTIFIED REGISTERED

## 2018-08-28 PROCEDURE — 7100000030 HC ASPR PHASE II RECOVERY - FIRST 15 MIN: Performed by: ORTHOPAEDIC SURGERY

## 2018-08-28 PROCEDURE — 6360000002 HC RX W HCPCS: Performed by: ORTHOPAEDIC SURGERY

## 2018-08-28 PROCEDURE — 6360000002 HC RX W HCPCS: Performed by: ANESTHESIOLOGY

## 2018-08-28 PROCEDURE — 3600000014 HC SURGERY LEVEL 4 ADDTL 15MIN: Performed by: ORTHOPAEDIC SURGERY

## 2018-08-28 PROCEDURE — 99283 EMERGENCY DEPT VISIT LOW MDM: CPT

## 2018-08-28 PROCEDURE — 2580000003 HC RX 258: Performed by: ORTHOPAEDIC SURGERY

## 2018-08-28 PROCEDURE — C1713 ANCHOR/SCREW BN/BN,TIS/BN: HCPCS | Performed by: ORTHOPAEDIC SURGERY

## 2018-08-28 PROCEDURE — 2720000010 HC SURG SUPPLY STERILE: Performed by: ORTHOPAEDIC SURGERY

## 2018-08-28 PROCEDURE — 29827 SHO ARTHRS SRG RT8TR CUF RPR: CPT | Performed by: ORTHOPAEDIC SURGERY

## 2018-08-28 PROCEDURE — 2500000003 HC RX 250 WO HCPCS: Performed by: ORTHOPAEDIC SURGERY

## 2018-08-28 PROCEDURE — 82947 ASSAY GLUCOSE BLOOD QUANT: CPT

## 2018-08-28 PROCEDURE — 2709999900 HC NON-CHARGEABLE SUPPLY: Performed by: ORTHOPAEDIC SURGERY

## 2018-08-28 DEVICE — ANCHOR SUT OD6.8MM 2 TWO PEEK OPTMA POLYMER TAPR NDL FOR ROT: Type: IMPLANTABLE DEVICE | Site: SHOULDER | Status: FUNCTIONAL

## 2018-08-28 RX ORDER — ROCURONIUM BROMIDE 10 MG/ML
INJECTION, SOLUTION INTRAVENOUS PRN
Status: DISCONTINUED | OUTPATIENT
Start: 2018-08-28 | End: 2018-08-28 | Stop reason: SDUPTHER

## 2018-08-28 RX ORDER — ONDANSETRON 2 MG/ML
INJECTION INTRAMUSCULAR; INTRAVENOUS PRN
Status: DISCONTINUED | OUTPATIENT
Start: 2018-08-28 | End: 2018-08-28 | Stop reason: SDUPTHER

## 2018-08-28 RX ORDER — OXYCODONE HYDROCHLORIDE AND ACETAMINOPHEN 5; 325 MG/1; MG/1
1-2 TABLET ORAL EVERY 4 HOURS PRN
Qty: 40 TABLET | Refills: 0 | Status: SHIPPED | OUTPATIENT
Start: 2018-08-28 | End: 2019-08-20

## 2018-08-28 RX ORDER — PROPOFOL 10 MG/ML
INJECTION, EMULSION INTRAVENOUS PRN
Status: DISCONTINUED | OUTPATIENT
Start: 2018-08-28 | End: 2018-08-28 | Stop reason: SDUPTHER

## 2018-08-28 RX ORDER — CEFAZOLIN SODIUM 1 G/3ML
INJECTION, POWDER, FOR SOLUTION INTRAMUSCULAR; INTRAVENOUS
Status: DISCONTINUED
Start: 2018-08-28 | End: 2018-08-28 | Stop reason: HOSPADM

## 2018-08-28 RX ORDER — NEOSTIGMINE METHYLSULFATE 1 MG/ML
INJECTION, SOLUTION INTRAVENOUS PRN
Status: DISCONTINUED | OUTPATIENT
Start: 2018-08-28 | End: 2018-08-28 | Stop reason: SDUPTHER

## 2018-08-28 RX ORDER — MEPERIDINE HYDROCHLORIDE 50 MG/ML
12.5 INJECTION INTRAMUSCULAR; INTRAVENOUS; SUBCUTANEOUS EVERY 5 MIN PRN
Status: DISCONTINUED | OUTPATIENT
Start: 2018-08-28 | End: 2018-08-28 | Stop reason: HOSPADM

## 2018-08-28 RX ORDER — EPHEDRINE SULFATE 50 MG/ML
INJECTION, SOLUTION INTRAVENOUS PRN
Status: DISCONTINUED | OUTPATIENT
Start: 2018-08-28 | End: 2018-08-28 | Stop reason: SDUPTHER

## 2018-08-28 RX ORDER — DIPHENHYDRAMINE HYDROCHLORIDE 50 MG/ML
12.5 INJECTION INTRAMUSCULAR; INTRAVENOUS
Status: DISCONTINUED | OUTPATIENT
Start: 2018-08-28 | End: 2018-08-28 | Stop reason: HOSPADM

## 2018-08-28 RX ORDER — PROMETHAZINE HYDROCHLORIDE 25 MG/ML
6.25 INJECTION, SOLUTION INTRAMUSCULAR; INTRAVENOUS
Status: COMPLETED | OUTPATIENT
Start: 2018-08-28 | End: 2018-08-28

## 2018-08-28 RX ORDER — SODIUM CHLORIDE 0.9 % (FLUSH) 0.9 %
10 SYRINGE (ML) INJECTION EVERY 12 HOURS SCHEDULED
Status: DISCONTINUED | OUTPATIENT
Start: 2018-08-28 | End: 2018-08-28 | Stop reason: HOSPADM

## 2018-08-28 RX ORDER — LIDOCAINE HYDROCHLORIDE 10 MG/ML
1 INJECTION, SOLUTION EPIDURAL; INFILTRATION; INTRACAUDAL; PERINEURAL
Status: DISCONTINUED | OUTPATIENT
Start: 2018-08-28 | End: 2018-08-28 | Stop reason: HOSPADM

## 2018-08-28 RX ORDER — SODIUM CHLORIDE 9 MG/ML
INJECTION, SOLUTION INTRAVENOUS CONTINUOUS
Status: DISCONTINUED | OUTPATIENT
Start: 2018-08-28 | End: 2018-08-28 | Stop reason: HOSPADM

## 2018-08-28 RX ORDER — SODIUM CHLORIDE, SODIUM LACTATE, POTASSIUM CHLORIDE, CALCIUM CHLORIDE 600; 310; 30; 20 MG/100ML; MG/100ML; MG/100ML; MG/100ML
INJECTION, SOLUTION INTRAVENOUS CONTINUOUS PRN
Status: DISCONTINUED | OUTPATIENT
Start: 2018-08-28 | End: 2018-08-28 | Stop reason: SDUPTHER

## 2018-08-28 RX ORDER — GLYCOPYRROLATE 1 MG/5 ML
SYRINGE (ML) INTRAVENOUS PRN
Status: DISCONTINUED | OUTPATIENT
Start: 2018-08-28 | End: 2018-08-28 | Stop reason: SDUPTHER

## 2018-08-28 RX ORDER — SODIUM CHLORIDE 0.9 % (FLUSH) 0.9 %
10 SYRINGE (ML) INJECTION PRN
Status: DISCONTINUED | OUTPATIENT
Start: 2018-08-28 | End: 2018-08-28 | Stop reason: HOSPADM

## 2018-08-28 RX ORDER — OXYCODONE HYDROCHLORIDE AND ACETAMINOPHEN 5; 325 MG/1; MG/1
1 TABLET ORAL PRN
Status: COMPLETED | OUTPATIENT
Start: 2018-08-28 | End: 2018-08-28

## 2018-08-28 RX ORDER — OXYCODONE HYDROCHLORIDE AND ACETAMINOPHEN 5; 325 MG/1; MG/1
2 TABLET ORAL PRN
Status: COMPLETED | OUTPATIENT
Start: 2018-08-28 | End: 2018-08-28

## 2018-08-28 RX ORDER — MIDAZOLAM HYDROCHLORIDE 1 MG/ML
INJECTION INTRAMUSCULAR; INTRAVENOUS PRN
Status: DISCONTINUED | OUTPATIENT
Start: 2018-08-28 | End: 2018-08-28 | Stop reason: SDUPTHER

## 2018-08-28 RX ORDER — KETOROLAC TROMETHAMINE 30 MG/ML
INJECTION, SOLUTION INTRAMUSCULAR; INTRAVENOUS PRN
Status: DISCONTINUED | OUTPATIENT
Start: 2018-08-28 | End: 2018-08-28 | Stop reason: SDUPTHER

## 2018-08-28 RX ORDER — FENTANYL CITRATE 50 UG/ML
INJECTION, SOLUTION INTRAMUSCULAR; INTRAVENOUS PRN
Status: DISCONTINUED | OUTPATIENT
Start: 2018-08-28 | End: 2018-08-28 | Stop reason: SDUPTHER

## 2018-08-28 RX ORDER — DEXAMETHASONE SODIUM PHOSPHATE 4 MG/ML
INJECTION, SOLUTION INTRA-ARTICULAR; INTRALESIONAL; INTRAMUSCULAR; INTRAVENOUS; SOFT TISSUE PRN
Status: DISCONTINUED | OUTPATIENT
Start: 2018-08-28 | End: 2018-08-28 | Stop reason: SDUPTHER

## 2018-08-28 RX ORDER — LABETALOL HYDROCHLORIDE 5 MG/ML
5 INJECTION, SOLUTION INTRAVENOUS EVERY 10 MIN PRN
Status: DISCONTINUED | OUTPATIENT
Start: 2018-08-28 | End: 2018-08-28 | Stop reason: HOSPADM

## 2018-08-28 RX ADMIN — HYDROMORPHONE HYDROCHLORIDE 0.5 MG: 1 INJECTION, SOLUTION INTRAMUSCULAR; INTRAVENOUS; SUBCUTANEOUS at 09:06

## 2018-08-28 RX ADMIN — EPHEDRINE SULFATE 10 MG: 50 INJECTION INTRAMUSCULAR; INTRAVENOUS; SUBCUTANEOUS at 08:07

## 2018-08-28 RX ADMIN — EPHEDRINE SULFATE 10 MG: 50 INJECTION INTRAMUSCULAR; INTRAVENOUS; SUBCUTANEOUS at 07:39

## 2018-08-28 RX ADMIN — MIDAZOLAM 2 MG: 1 INJECTION INTRAMUSCULAR; INTRAVENOUS at 07:27

## 2018-08-28 RX ADMIN — HYDROMORPHONE HYDROCHLORIDE 0.5 MG: 1 INJECTION, SOLUTION INTRAMUSCULAR; INTRAVENOUS; SUBCUTANEOUS at 09:15

## 2018-08-28 RX ADMIN — Medication 2 G: at 07:28

## 2018-08-28 RX ADMIN — ROCURONIUM BROMIDE 10 MG: 10 INJECTION INTRAVENOUS at 07:33

## 2018-08-28 RX ADMIN — ONDANSETRON 4 MG: 2 INJECTION INTRAMUSCULAR; INTRAVENOUS at 08:26

## 2018-08-28 RX ADMIN — SODIUM CHLORIDE, POTASSIUM CHLORIDE, SODIUM LACTATE AND CALCIUM CHLORIDE: 600; 310; 30; 20 INJECTION, SOLUTION INTRAVENOUS at 08:27

## 2018-08-28 RX ADMIN — PROPOFOL 150 MG: 10 INJECTION, EMULSION INTRAVENOUS at 07:29

## 2018-08-28 RX ADMIN — FENTANYL CITRATE 100 MCG: 50 INJECTION, SOLUTION INTRAMUSCULAR; INTRAVENOUS at 07:29

## 2018-08-28 RX ADMIN — DEXAMETHASONE SODIUM PHOSPHATE 8 MG: 4 INJECTION, SOLUTION INTRAMUSCULAR; INTRAVENOUS at 07:43

## 2018-08-28 RX ADMIN — NEOSTIGMINE METHYLSULFATE 3 MG: 1 INJECTION, SOLUTION INTRAVENOUS at 08:27

## 2018-08-28 RX ADMIN — PROPOFOL 20 MG: 10 INJECTION, EMULSION INTRAVENOUS at 07:56

## 2018-08-28 RX ADMIN — PROMETHAZINE HYDROCHLORIDE 6.25 MG: 25 INJECTION, SOLUTION INTRAMUSCULAR; INTRAVENOUS at 09:05

## 2018-08-28 RX ADMIN — KETOROLAC TROMETHAMINE 30 MG: 30 INJECTION, SOLUTION INTRAMUSCULAR at 08:26

## 2018-08-28 RX ADMIN — EPHEDRINE SULFATE 10 MG: 50 INJECTION INTRAMUSCULAR; INTRAVENOUS; SUBCUTANEOUS at 07:43

## 2018-08-28 RX ADMIN — PROPOFOL 50 MG: 10 INJECTION, EMULSION INTRAVENOUS at 07:34

## 2018-08-28 RX ADMIN — Medication 0.6 MG: at 08:27

## 2018-08-28 RX ADMIN — OXYCODONE HYDROCHLORIDE AND ACETAMINOPHEN 2 TABLET: 5; 325 TABLET ORAL at 09:59

## 2018-08-28 RX ADMIN — FENTANYL CITRATE 50 MCG: 50 INJECTION, SOLUTION INTRAMUSCULAR; INTRAVENOUS at 07:56

## 2018-08-28 RX ADMIN — ROCURONIUM BROMIDE 40 MG: 10 INJECTION INTRAVENOUS at 07:29

## 2018-08-28 RX ADMIN — SODIUM CHLORIDE, POTASSIUM CHLORIDE, SODIUM LACTATE AND CALCIUM CHLORIDE: 600; 310; 30; 20 INJECTION, SOLUTION INTRAVENOUS at 07:09

## 2018-08-28 ASSESSMENT — PULMONARY FUNCTION TESTS
PIF_VALUE: 18
PIF_VALUE: 6
PIF_VALUE: 18
PIF_VALUE: 19
PIF_VALUE: 18
PIF_VALUE: 22
PIF_VALUE: 19
PIF_VALUE: 18
PIF_VALUE: 23
PIF_VALUE: 19
PIF_VALUE: 16
PIF_VALUE: 18
PIF_VALUE: 19
PIF_VALUE: 19
PIF_VALUE: 2
PIF_VALUE: 18
PIF_VALUE: 3
PIF_VALUE: 16
PIF_VALUE: 16
PIF_VALUE: 19
PIF_VALUE: 17
PIF_VALUE: 18
PIF_VALUE: 19
PIF_VALUE: 19
PIF_VALUE: 20
PIF_VALUE: 17
PIF_VALUE: 17
PIF_VALUE: 18
PIF_VALUE: 18
PIF_VALUE: 19
PIF_VALUE: 27
PIF_VALUE: 19
PIF_VALUE: 18
PIF_VALUE: 16
PIF_VALUE: 18
PIF_VALUE: 4
PIF_VALUE: 19
PIF_VALUE: 18
PIF_VALUE: 18
PIF_VALUE: 19
PIF_VALUE: 16
PIF_VALUE: 19
PIF_VALUE: 19
PIF_VALUE: 14
PIF_VALUE: 18
PIF_VALUE: 3
PIF_VALUE: 18
PIF_VALUE: 19
PIF_VALUE: 16
PIF_VALUE: 18
PIF_VALUE: 1
PIF_VALUE: 18
PIF_VALUE: 16
PIF_VALUE: 19
PIF_VALUE: 9
PIF_VALUE: 19
PIF_VALUE: 19
PIF_VALUE: 18
PIF_VALUE: 19
PIF_VALUE: 17
PIF_VALUE: 4
PIF_VALUE: 18
PIF_VALUE: 19
PIF_VALUE: 4
PIF_VALUE: 3
PIF_VALUE: 15
PIF_VALUE: 19
PIF_VALUE: 18

## 2018-08-28 ASSESSMENT — ENCOUNTER SYMPTOMS
ABDOMINAL PAIN: 0
BACK PAIN: 0
DIARRHEA: 0
EYE PAIN: 0
RHINORRHEA: 0
VOMITING: 0
EYE DISCHARGE: 0
DIARRHEA: 0
EYE REDNESS: 0
EYE DISCHARGE: 0
EYE REDNESS: 0
BACK PAIN: 0
SHORTNESS OF BREATH: 0
EYE PAIN: 0
VOMITING: 0
ABDOMINAL PAIN: 0
COUGH: 0
COUGH: 0
RHINORRHEA: 0
SHORTNESS OF BREATH: 0

## 2018-08-28 ASSESSMENT — PAIN DESCRIPTION - ORIENTATION
ORIENTATION: LEFT
ORIENTATION: LEFT

## 2018-08-28 ASSESSMENT — PAIN DESCRIPTION - LOCATION: LOCATION: SHOULDER

## 2018-08-28 ASSESSMENT — PAIN SCALES - GENERAL
PAINLEVEL_OUTOF10: 7
PAINLEVEL_OUTOF10: 10
PAINLEVEL_OUTOF10: 6
PAINLEVEL_OUTOF10: 7
PAINLEVEL_OUTOF10: 8
PAINLEVEL_OUTOF10: 9
PAINLEVEL_OUTOF10: 10
PAINLEVEL_OUTOF10: 8
PAINLEVEL_OUTOF10: 8

## 2018-08-28 ASSESSMENT — PAIN - FUNCTIONAL ASSESSMENT: PAIN_FUNCTIONAL_ASSESSMENT: 0-10

## 2018-08-28 ASSESSMENT — PAIN DESCRIPTION - PAIN TYPE
TYPE: SURGICAL PAIN
TYPE: SURGICAL PAIN
TYPE: ACUTE PAIN
TYPE: SURGICAL PAIN
TYPE: SURGICAL PAIN

## 2018-08-28 ASSESSMENT — PAIN DESCRIPTION - DESCRIPTORS
DESCRIPTORS: STABBING
DESCRIPTORS: SHARP

## 2018-08-28 NOTE — H&P
falls or trauma. No redness, swelling or rashes. Pt C/O of pain, weakness, stiffness and limitation in the range of motion (Abduction to 80 Degrees). Pt denies any other symptoms.   No Hx of MRSA infections in the past.     PAST MEDICAL HISTORY      Past Medical History        Past Medical History:   Diagnosis Date    DM2 (diabetes mellitus, type 2) ([facility] [address] 6/9/2015    Hyperlipidemia      Obesity (BMI 30.0-34.9) 12/8/2015    Poor compliance           Pt denies any history of hypertension, stroke, heart disease, COPD, Asthma, GERD, Cancer, Seizures,Thyroid disease, Hepatitis, TB, Psychiatric Disorders or Substance abuse.     SURGICAL HISTORY        Past Surgical History         Past Surgical History:   Procedure Laterality Date    COLONOSCOPY   12/02/14     patient states no problem found    FOOT NEUROMA SURGERY Right 8/2/2017     FOOT NEUROMA EXCISION performed by Kisha Butts DPM at [address] [place] [address],[facility] ARTHROSCOPY Right 2013    UPPER GASTROINTESTINAL ENDOSCOPY   12/02/2014     patient states no problem found            FAMILY HISTORY        Family History         Family History   Problem Relation Age of Onset    Alzheimer's Disease Mother      Diabetes Father              SOCIAL HISTORY        Social History   Social History            Social History    Marital status:        Spouse name: N/A    Number of children: N/A    Years of education: N/A             Social History Main Topics    Smoking status: Former Smoker       Packs/day: 0.50       Years: 6.00       Types: Cigarettes       Quit date: 1/9/1992    Smokeless tobacco: Never Used    Alcohol use Yes         Comment: 3 beers/day    Drug use: No    Sexual activity: Not Asked           Other Topics Concern    None          Social History Narrative    None               REVIEW OF SYSTEMS       No Known Allergies            Current Outpatient Prescriptions on File Prior to Encounter   Medication Sig Dispense Refill    metFORMIN

## 2018-08-28 NOTE — TELEPHONE ENCOUNTER
I see there is a message out for Dr. Thornton Blamerna office on the phone from the wife, has not been routed to Dr. Sahil Colin thus far. No advise entered from the office as of yet.

## 2018-08-28 NOTE — TELEPHONE ENCOUNTER
Patient's wife spoke with Dr. Baxter [facility], who referred him back to the ER for urinary retention. Telephone Encounter  Encounter Date: 8/28/2018  Lorelei Gu RN        L shoulder surgery this morning. Has not been able to void. Did not void at hospital, so last time was early this morning before arrival at hospital, when he first got up.  Has the feeling, but no passing of urine.     Has had some fluids and of course the IV fluids.     Advised wife to call PCP for further advice and that we would call back later today to check on pt.              Telephone Encounter Info     Author Note Status Last Update User   Lorelei Gu RN Signed Lorelei Gu RN   Last Update Date/Time: 8/28/2018 11:51 AM

## 2018-08-28 NOTE — ED NOTES
Dr. Talbot Phalen asked by Abimael Siddiqi RN to order routine urinalysis per our hospital policy for any pt receiving a catheter. Dr. Talbot Phalen stated not needed as jennings catheter was inserted due to post surgical urinary retention. No order received for urinalysis.      Herber Graves RN  08/28/18 [id number]

## 2018-08-28 NOTE — ED PROVIDER NOTES
[address]  eMERGENCY dEPARTMENT eNCOUnter      Pt Name: Denae Ceja  MRN: [id number]  [place] 1965  Date of evaluation: 8/28/2018  PCP:    Liyah Coombs MD      CHIEF COMPLAINT       Chief Complaint   Patient presents with    Urinary Retention     outpt rotator cuff Sx today         HISTORY OF PRESENT ILLNESS      Denae Ceja is a 45 y.o. male who presents For evaluation for urinary retention. Patient has surgery this morning and since then he has not have unable to urinate. He has a pressured ago however nothing is coming out. He's never had this issue before. Patient states he feels a lot of pressure. Otherwise no other complaints       REVIEW OF SYSTEMS         Review of Systems   Constitutional: Negative for chills and fever. HENT: Negative for congestion and rhinorrhea. Eyes: Negative for pain, discharge and redness. Respiratory: Negative for cough and shortness of breath. Cardiovascular: Negative for chest pain. Gastrointestinal: Negative for abdominal pain, diarrhea and vomiting. Genitourinary: Negative for dysuria and hematuria. Urinary retention   Musculoskeletal: Negative for arthralgias, back pain, myalgias, neck pain and neck stiffness. Skin: Negative for rash. Neurological: Negative for light-headedness and headaches. Hematological: Does not bruise/bleed easily.           PAST MEDICAL HISTORY     Past Medical History:   Diagnosis Date    DM2 (diabetes mellitus, type 2) ([email or web address] 6/9/2015    Hyperlipidemia     Obesity (BMI 30.0-34.9) 12/8/2015    Poor compliance        SURGICAL HISTORY       Past Surgical History:   Procedure Laterality Date    COLONOSCOPY  12/02/14    patient states no problem found    FOOT NEUROMA SURGERY Right 8/2/2017    FOOT NEUROMA EXCISION performed by Woodrow Donahue DPM at [address] Left 8/28/2018    SHOULDER ARTHROSCOPY W/MINI OPEN ROTATOR CUFF REPAIR

## 2018-08-28 NOTE — TELEPHONE ENCOUNTER
Juan Carlos Palafox took Max to the ER because he couln't urinate. They put in a catheter. Will need to see a urologist to have it removed.  SASHA

## 2018-08-29 NOTE — ED PROVIDER NOTES
motion. Neck supple. Cardiovascular: Normal rate, regular rhythm, normal heart sounds and intact distal pulses. Pulmonary/Chest: Effort normal and breath sounds normal. No respiratory distress. He has no wheezes. Abdominal: Soft. Bowel sounds are normal. He exhibits no distension. There is no tenderness. Musculoskeletal: Normal range of motion. Neurological: He is alert and oriented to person, place, and time. Skin: Skin is warm and dry. Nursing note and vitals reviewed. DIFFERENTIAL DIAGNOSIS/ MDM:     Patient here with likely just irritation from the Trujillo catheter. Nursing checked and it all appears to be well. Patient stable for discharge    DIAGNOSTIC RESULTS     none      EMERGENCY DEPARTMENT COURSE:   Vitals:    Vitals:    08/28/18 1942   BP: 128/76   Pulse: 59   Resp: 14   Temp: 97.9 °F (36.6 °C)   TempSrc: Oral   SpO2: 97%   Weight: 180 lb (81.6 kg)   Height: 5' 2\" (1.575 m)     -------------------------  BP: 128/76, Temp: 97.9 °F (36.6 °C), Pulse: 59, Resp: 14      FINAL IMPRESSION      1.  Trujillo catheter problem, initial encounter [facility])          DISPOSITION/PLAN    DISPOSITION Decision To Discharge 08/28/2018 08:37:07 PM      PATIENT REFERRED TO:  Liyah Coombs MD  [facility] [address] [phone]    Call in 2 days        DISCHARGE MEDICATIONS:  New Prescriptions    No medications on file       (Please note that portions of this note were completed with a voice recognition program.  Efforts were made to edit the dictations but occasionally words are mis-transcribed.)    Lexx Kendrick MD, SITA, [facility]  Attending Emergency Physician                     Lexx Kendrick MD  08/28/18 [id number]

## 2018-08-30 ENCOUNTER — OFFICE VISIT (OUTPATIENT)
Dept: UROLOGY | Age: 53
End: 2018-08-30
Payer: COMMERCIAL

## 2018-08-30 VITALS — DIASTOLIC BLOOD PRESSURE: 73 MMHG | HEART RATE: 55 BPM | SYSTOLIC BLOOD PRESSURE: 132 MMHG | TEMPERATURE: 98.1 F

## 2018-08-30 DIAGNOSIS — R33.8 BENIGN PROSTATIC HYPERPLASIA WITH URINARY RETENTION: Primary | ICD-10-CM

## 2018-08-30 DIAGNOSIS — N40.1 BENIGN PROSTATIC HYPERPLASIA WITH URINARY RETENTION: Primary | ICD-10-CM

## 2018-08-30 DIAGNOSIS — R33.9 URINARY RETENTION: ICD-10-CM

## 2018-08-30 PROCEDURE — 51700 IRRIGATION OF BLADDER: CPT | Performed by: UROLOGY

## 2018-08-30 PROCEDURE — 99204 OFFICE O/P NEW MOD 45 MIN: CPT | Performed by: UROLOGY

## 2018-08-30 RX ORDER — TAMSULOSIN HYDROCHLORIDE 0.4 MG/1
0.4 CAPSULE ORAL DAILY
Qty: 90 CAPSULE | Refills: 3 | Status: SHIPPED | OUTPATIENT
Start: 2018-08-30 | End: 2019-08-20 | Stop reason: CLARIF

## 2018-08-30 ASSESSMENT — ENCOUNTER SYMPTOMS
EYE REDNESS: 0
EYE PAIN: 0
WHEEZING: 0
CONSTIPATION: 1
SHORTNESS OF BREATH: 0
COLOR CHANGE: 0
COUGH: 0
NAUSEA: 0
VOMITING: 0
BACK PAIN: 0
ABDOMINAL PAIN: 0

## 2018-08-30 NOTE — PROGRESS NOTES
[facility]  [facility] - [place]  [address]  [address]  [address] [facility] [address]  Dept: [phone]  Dept Fax: [phone] [facility] Urology Office Note - New patient    Patient:  Jennifer Pimentel  YOB: 1965  Date: 8/30/2018    The patient is a 45 y.o. male who presents today for evaluation of the following problems:   Chief Complaint   Patient presents with    Urinary Retention     post shoulder surgery - bladder scan of ~550mL, cath inserted for ~400mL, cath placed 8/28 - no Flomax given    Constipation     given stool softeners and MOM and prune juice last night - BM this AM was hard    referred by Taylor Lynch MD.      HPI  Here for follow up on post op retention after rotator couff surgery. He went into retention 8/28/18- a cath was placed for 400 ml and he was sent home. He was not started on Flomax. He has been constipated post -op, his wife gave him MOM and stool softener and prune juice. (Patient's old records have been requested, reviewed and summarized in today's note.)    Summary of old records: N/A    Additional History: N/A    Procedures Today: N/A    Last several PSA's:  Lab Results   Component Value Date    PSA 1.08 03/24/2018     Last total testosterone:  No results found for: TESTOSTERONE  Urinalysis today:  No results found for this visit on 08/30/18. AUA Symptom Score (8/30/2018):                                Last BUN and creatinine:  Lab Results   Component Value Date    BUN 16 08/14/2018     Lab Results   Component Value Date    CREATININE 0.74 08/14/2018       Additional Lab/Culture results: none    Imaging Reviewed during this Office Visit: none  (results were independently reviewed by physician and radiology report verified)    PAST MEDICAL, FAMILY AND SOCIAL HISTORY:  Past Medical History:   Diagnosis Date    DM2 (diabetes mellitus, type 2) ([address] 6/9/2015    Hyperlipidemia     Obesity (BMI 30.0-34.9) 12/8/2015    Poor time.  Psych: Mood normal, affect normal  Skin: No rash noted  HEENT: Head: Normocephalic and atraumatic  Conjunctivae and EOM are normal. Pupils are equal, round  Nose: Normal  Right External Ear: Normal; Left External Ear: Normal  Mouth: Mucosa Moist  Neck: Supple  Lungs: Respiratory effort is normal  Cardiovascular: Warm & Pink  Abdomen: Soft, non-tender, non-distended with no CVA,  No flank tenderness,  Or hepatosplenomegaly   Lymphatics: No palpable lymphadenopathy. Bladder non-tender and not distended. Musculoskeletal: Normal gait and station        Assessment and Plan      1. Benign prostatic hyperplasia with urinary retention           Plan:    void trial today    Prescriptions Ordered:  Orders Placed This Encounter   Medications    tamsulosin (FLOMAX) 0.4 MG capsule     Sig: Take 1 capsule by mouth daily     Dispense:  90 capsule     Refill:  3      Orders Placed:  Orders Placed This Encounter   Procedures    PSA, Diagnostic     Standing Status:   Future     Standing Expiration Date:   8/25/2019            Guzman Stacy MD    Agree with the ROS entered by the MA.

## 2018-08-30 NOTE — PROGRESS NOTES
Review of Systems   Constitutional: Negative for activity change, chills and fever. Eyes: Negative for pain, redness and visual disturbance. Respiratory: Negative for cough, shortness of breath and wheezing. Cardiovascular: Negative for chest pain and leg swelling. Gastrointestinal: Positive for constipation. Negative for abdominal pain, nausea and vomiting. Genitourinary: Negative for difficulty urinating, discharge, dysuria, flank pain, frequency, hematuria, scrotal swelling and testicular pain. Catheter in place   Musculoskeletal: Positive for joint swelling and myalgias. Negative for back pain. Skin: Negative for color change and rash. Neurological: Negative for dizziness, tremors and numbness. Hematological: Negative for adenopathy. Does not bruise/bleed easily.

## 2018-09-12 ENCOUNTER — OFFICE VISIT (OUTPATIENT)
Dept: ORTHOPEDIC SURGERY | Age: 53
End: 2018-09-12

## 2018-09-12 DIAGNOSIS — M25.512 CHRONIC LEFT SHOULDER PAIN: Primary | ICD-10-CM

## 2018-09-12 DIAGNOSIS — G89.29 CHRONIC LEFT SHOULDER PAIN: Primary | ICD-10-CM

## 2018-09-12 PROCEDURE — 99024 POSTOP FOLLOW-UP VISIT: CPT | Performed by: ORTHOPAEDIC SURGERY

## 2018-09-12 NOTE — PROGRESS NOTES
Mark Pinto M.D.            [address] [facility],[address], [address] [facility]             Dept Phone: [phone]             Dept Fax:  [phone] returns today status post rotator cuff repair left shoulder on 8/28/18. He states that his pain is tolerable    Physical examination notes his wound is pristine. He's neurovascular intact. I can motion her shoulder around minimal discomfort. Impression  Status post mini open rotator cuff repair left shoulder    Plan  Max will be doing a home exercise program at this point with a [employer] as well as a active assisted exercises that were shown to the patient. Continue with the sling. We'll see him back in 4 weeks before initiating a formal physical therapy regimen. Review of Systems   Constitutional: Negative. HENT: Negative. Respiratory: Negative. Cardiovascular: Negative. Musculoskeletal: Negative. Neurological: Negative. Past Medical History:   Diagnosis Date    DM2 (diabetes mellitus, type 2) ([facility] [address] 6/9/2015    Hyperlipidemia     Obesity (BMI 30.0-34.9) 12/8/2015    Poor compliance      Past Surgical History:   Procedure Laterality Date    COLONOSCOPY  12/02/14    patient states no problem found    FOOT NEUROMA SURGERY Right 8/2/2017    FOOT NEUROMA EXCISION performed by Karma Peres DPM at [address] Left 8/28/2018    SHOULDER ARTHROSCOPY W/MINI OPEN ROTATOR CUFF REPAIR performed by Sang العلي MD at [address] ARTHROSCOPY Right 2013    UPPER GASTROINTESTINAL ENDOSCOPY  12/02/2014    patient states no problem found     Family History   Problem Relation Age of Onset    Alzheimer's Disease Mother     Diabetes Father          No orders of the defined types were placed in this encounter.       1. Chronic left shoulder pain        Sang العلي MD    Please note that this chart was generated using voice recognition Dragon dictation software. Although every effort was made to ensure the accuracy of this automated transcription, some errors in transcription may have occurred.

## 2018-10-10 ENCOUNTER — OFFICE VISIT (OUTPATIENT)
Dept: ORTHOPEDIC SURGERY | Age: 53
End: 2018-10-10

## 2018-10-10 DIAGNOSIS — Z98.890 S/P ARTHROSCOPY OF LEFT SHOULDER: Primary | ICD-10-CM

## 2018-10-10 PROCEDURE — 99024 POSTOP FOLLOW-UP VISIT: CPT | Performed by: ORTHOPAEDIC SURGERY

## 2018-10-11 ENCOUNTER — TELEPHONE (OUTPATIENT)
Dept: UROLOGY | Age: 53
End: 2018-10-11

## 2018-10-11 NOTE — LETTER
[facility] - [address] [address]  Phone: [phone]  Fax: [phone]    Joe Phelan MD        October 11, 2018     [address] [place] [id number]      Dear Marilou Edward: We are sorry that you missed your appointment with Dr. Gisselle Estrella on 10/11/2018. Your health and follow-up medical care are important to us. Please call our office as soon as possible so that we may reschedule your appointment. If you have already rescheduled your appointment, please disregard this letter.     Sincerely,        Joe Phelan MD

## 2018-10-18 ENCOUNTER — HOSPITAL ENCOUNTER (OUTPATIENT)
Dept: PHYSICAL THERAPY | Age: 53
Setting detail: THERAPIES SERIES
Discharge: HOME | End: 2018-10-18
Payer: COMMERCIAL

## 2018-10-18 PROCEDURE — 97110 THERAPEUTIC EXERCISES: CPT

## 2018-10-18 PROCEDURE — 97161 PT EVAL LOW COMPLEX 20 MIN: CPT

## 2018-10-18 ASSESSMENT — PAIN SCALES - GENERAL: PAINLEVEL_OUTOF10: 5

## 2018-10-18 ASSESSMENT — PAIN DESCRIPTION - PAIN TYPE: TYPE: ACUTE PAIN

## 2018-10-18 ASSESSMENT — PAIN DESCRIPTION - FREQUENCY: FREQUENCY: CONTINUOUS

## 2018-10-18 ASSESSMENT — PAIN DESCRIPTION - DESCRIPTORS: DESCRIPTORS: CONSTANT;SHARP

## 2018-10-18 ASSESSMENT — PAIN DESCRIPTION - LOCATION: LOCATION: SHOULDER

## 2018-10-18 ASSESSMENT — PAIN DESCRIPTION - ONSET: ONSET: UNABLE TO TELL

## 2018-10-18 ASSESSMENT — PAIN DESCRIPTION - PROGRESSION: CLINICAL_PROGRESSION: GRADUALLY IMPROVING

## 2018-10-18 ASSESSMENT — PAIN DESCRIPTION - ORIENTATION: ORIENTATION: LEFT

## 2018-10-22 ENCOUNTER — HOSPITAL ENCOUNTER (OUTPATIENT)
Dept: PHYSICAL THERAPY | Age: 53
Setting detail: THERAPIES SERIES
Discharge: HOME | End: 2018-10-22
Payer: COMMERCIAL

## 2018-10-22 PROCEDURE — 97110 THERAPEUTIC EXERCISES: CPT

## 2018-10-22 ASSESSMENT — PAIN SCALES - GENERAL: PAINLEVEL_OUTOF10: 3

## 2018-10-22 ASSESSMENT — PAIN DESCRIPTION - PAIN TYPE: TYPE: ACUTE PAIN

## 2018-10-22 ASSESSMENT — PAIN DESCRIPTION - ORIENTATION: ORIENTATION: LEFT

## 2018-10-22 ASSESSMENT — PAIN DESCRIPTION - LOCATION: LOCATION: SHOULDER

## 2018-10-22 ASSESSMENT — PAIN DESCRIPTION - DESCRIPTORS: DESCRIPTORS: CONSTANT;SHARP

## 2018-10-22 ASSESSMENT — PAIN DESCRIPTION - FREQUENCY: FREQUENCY: CONTINUOUS

## 2018-10-23 NOTE — PROGRESS NOTES
Physical Therapy  [address] [place] [address]   Outpatient Physical Therapy  [address]  Phone: [phone]  Fax: [phone]  Daily Progress Note    Date: 10/22/18    Patient Name: Filemon Rankin        MRN: [id number]  Account: [de-identified] : 1965          10/22/18 1610   General   Chart Reviewed Yes   Patient assessed for rehabilitation services? Yes   Family / Caregiver Present No   Referring Practitioner Silvana Laguna MD    Referral Date  10/10/18   Diagnosis Arthroscopy of (L) shoulder (Z98.890)   Other (Comment) Increase ROM and strengthening of (L) shoulder (7 weeks post op for (L) shoulder RTC tear/repair)    PT Visit Information   Onset Date 18   PT Insurance Information [employer]- [place] PPO    Total # of Visits Approved 18   Total # of Visits to Date 2   No Show 0   Canceled Appointment 0   Subjective   Subjective Patient reports no increased pain after last treatment. General Comment   Comments Patient needed to leave by 4 PM today (30 minutes)   Pain Screening   Patient Currently in Pain Yes   Pain Assessment   Pain Assessment 0-10   Pain Level 3   Pain Type Acute pain   Pain Location Shoulder   Pain Orientation Left   Pain Descriptors Constant; Sharp   Pain Frequency Continuous   Exercises   Exercise 1 Pulleys Flex/Abd 10 reps ea dir 5\" hold   Exercise 2 Supine PROM L UE flexion/ABD/ER/IR 5 reps x15\" hold  (ER/IR at 90 degrees ABD)   Exercise 3 Supine L shoulder serratus punches x15 reps   Exercise 4 Supine L shoulder ABCs x1   Exercise 5 Prone L shoulder extension x15 reps   Exercise 6 Prone L Shoulder Rows x15 reps   Exercise 7 Prone L shoulder horizontal Abduction x10 reps   Conditions Requiring Skilled Therapeutic Intervention   Body structures, Functions, Activity limitations Decreased functional mobility ; Decreased ADL status; Decreased ROM; Decreased strength   Treatment Diagnosis (L) shoulder weakness/dysfunction.     Prognosis Excellent   REQUIRES PT FOLLOW UP Yes

## 2018-10-24 ENCOUNTER — HOSPITAL ENCOUNTER (OUTPATIENT)
Dept: PHYSICAL THERAPY | Age: 53
Setting detail: THERAPIES SERIES
Discharge: HOME | End: 2018-10-24
Payer: COMMERCIAL

## 2018-10-24 PROCEDURE — 97110 THERAPEUTIC EXERCISES: CPT

## 2018-10-24 ASSESSMENT — PAIN SCALES - GENERAL: PAINLEVEL_OUTOF10: 3

## 2018-10-24 ASSESSMENT — PAIN DESCRIPTION - LOCATION: LOCATION: SHOULDER

## 2018-10-24 ASSESSMENT — PAIN DESCRIPTION - FREQUENCY: FREQUENCY: CONTINUOUS

## 2018-10-24 ASSESSMENT — PAIN DESCRIPTION - DESCRIPTORS: DESCRIPTORS: CONSTANT;ACHING;SHARP

## 2018-10-24 ASSESSMENT — PAIN DESCRIPTION - ORIENTATION: ORIENTATION: LEFT

## 2018-10-24 ASSESSMENT — PAIN DESCRIPTION - PAIN TYPE: TYPE: ACUTE PAIN

## 2018-10-26 ENCOUNTER — HOSPITAL ENCOUNTER (OUTPATIENT)
Dept: PHYSICAL THERAPY | Age: 53
Setting detail: THERAPIES SERIES
Discharge: HOME | End: 2018-10-26
Payer: COMMERCIAL

## 2018-10-26 PROCEDURE — 97110 THERAPEUTIC EXERCISES: CPT

## 2018-10-26 ASSESSMENT — PAIN DESCRIPTION - LOCATION: LOCATION: SHOULDER

## 2018-10-26 ASSESSMENT — PAIN DESCRIPTION - PAIN TYPE: TYPE: ACUTE PAIN

## 2018-10-26 ASSESSMENT — PAIN DESCRIPTION - DESCRIPTORS: DESCRIPTORS: CONSTANT;SHARP

## 2018-10-26 ASSESSMENT — PAIN SCALES - GENERAL: PAINLEVEL_OUTOF10: 3

## 2018-10-26 ASSESSMENT — PAIN DESCRIPTION - ORIENTATION: ORIENTATION: LEFT

## 2018-10-26 ASSESSMENT — PAIN DESCRIPTION - FREQUENCY: FREQUENCY: CONTINUOUS

## 2018-10-29 ENCOUNTER — HOSPITAL ENCOUNTER (OUTPATIENT)
Dept: PHYSICAL THERAPY | Age: 53
Setting detail: THERAPIES SERIES
Discharge: HOME | End: 2018-10-29
Payer: COMMERCIAL

## 2018-10-31 ENCOUNTER — HOSPITAL ENCOUNTER (OUTPATIENT)
Dept: PHYSICAL THERAPY | Age: 53
Setting detail: THERAPIES SERIES
Discharge: HOME | End: 2018-10-31
Payer: COMMERCIAL

## 2018-10-31 PROCEDURE — 97110 THERAPEUTIC EXERCISES: CPT

## 2018-10-31 ASSESSMENT — PAIN DESCRIPTION - LOCATION: LOCATION: SHOULDER

## 2018-10-31 ASSESSMENT — PAIN SCALES - GENERAL: PAINLEVEL_OUTOF10: 4

## 2018-10-31 ASSESSMENT — PAIN DESCRIPTION - ORIENTATION: ORIENTATION: LEFT

## 2018-10-31 ASSESSMENT — PAIN DESCRIPTION - PAIN TYPE: TYPE: SURGICAL PAIN

## 2018-10-31 NOTE — PROGRESS NOTES
Physical Therapy  [address]   Outpatient Physical Therapy  [address] [facility]. [address]  Phone: [phone]  Fax: [phone]  Daily Progress Note    Date: 10/31/18    Patient Name: Slade Bishop        MRN: [id number]  Account: [de-identified] : 1965      General Information  Referring Practitioner: Bismark Obrien MD   Referral Date : 10/10/18  Diagnosis: Arthroscopy of (L) shoulder (Z98.890)  Other (Comment): Increase ROM and strengthening of (L) shoulder (7 weeks post op for (L) shoulder RTC tear/repair)   Onset Date: 18  PT Insurance Information: [facility]- [place] PPO   Total # of Visits Approved: 18  Total # of Visits to Date: 5  No Show: 0  Canceled Appointment: 1    Subjective  Reports icing at home and stretching with cane exercise. Pain increases at night and has difficulty getting comfortable. Notes difficulty reaching behind back     Pain  Patient Currently in Pain: Yes  Pain Assessment: 0-10  Pain Level: 4  Pain Type: Surgical pain  Pain Location: Shoulder  Pain Orientation: Left    Objective  Exercise 1: Pulleys Flex/Abd 10 reps ea dir 5\" hold  Exercise 2: Supine PROM (L) UE flexion, abduction, ER @ 90-90 and IR @ 90-90 30 sec hold x 3 reps with each one.    Exercise 3: Supine (L) shoulder serratus punch 2# 2x10  Exercise 4: Supine (L) shoulder ABCs x 1  Exercise 5: Side lying (R)-(L) shoulder ER 30 reps   Exercise 6: Prone (L) shoulder extension 30 reps   Exercise 7: Prone (L) shoulder rows  30 reps   Exercise 8: Prone (L) shoulder horizontal abduction 2x10  Exercise 9: Supine Rhythmic Stabilization 90* Flexion- Holding Yellow Ball 2x30\"  Exercise 10: Standing Retro Shrugs with Scapular Retraction 10 lbs 30 reps   Exercise 11: R2 Rows/Pull Downs 2x10  Exercise 12: Side lying (R)- L Shoulder Flexion 10x  Exercise 13: *Add Small Ball on Wall w/ L UE @ 90* Flexion- Circles cw/ccw 30 seconds each  Exercise 14: Standing Ball on Wall Stretch 5x 3-Ways  Exercise 15: *Add Apley IR Stretch    Comment  Comments: 9 Weeks Post Op 10/30/18    Assessment  Body structures, Functions, Activity limitations: Decreased functional mobility ; Decreased ADL status; Decreased ROM; Decreased strength  Treatment Diagnosis: (L) shoulder weakness/dysfunction. Prognosis: Excellent  REQUIRES PT FOLLOW UP: Yes    Activity Tolerance  Patient Tolerated treatment well  Comments: Pain with AROM Supine shoulder flexion @ ~120*- Modified to side lying. Progressed with scapular stabilization exercise without issue. Encouraged proper technique and educated patient on healing process. Emphasis on working to fatigue with dorsal stabilization exercise but to avoid increasing pain.      Plan  Continue with current plan (Progress with dorsal stability and AROM of R UE)    Therapy Time:  Time In: 1016  Time Out: 1058  Minutes: 42  Timed Code Treatment Minutes: 40 Minutes    Treatment Charges: Minutes Units   []  Ultrasound     []  Electrical-Stim     []  Iontophoresis     []  Traction     []  Massage       []  Eval     []  Gait     []  Vasopneumatic Device     [x]  Ther Exercise 40  3   []  Manual Therapy       []  Ther Activities       []  Aquatics     []  Neuro Re-Ed       []  Other       Total Treatment Time: 36 BRISA Camacho

## 2018-11-02 ENCOUNTER — HOSPITAL ENCOUNTER (OUTPATIENT)
Dept: PHYSICAL THERAPY | Age: 53
Setting detail: THERAPIES SERIES
Discharge: HOME | End: 2018-11-02
Payer: COMMERCIAL

## 2018-11-02 PROCEDURE — 97110 THERAPEUTIC EXERCISES: CPT

## 2018-11-02 ASSESSMENT — PAIN DESCRIPTION - DESCRIPTORS: DESCRIPTORS: CONSTANT;SHARP

## 2018-11-02 ASSESSMENT — PAIN DESCRIPTION - PAIN TYPE: TYPE: ACUTE PAIN

## 2018-11-02 ASSESSMENT — PAIN DESCRIPTION - FREQUENCY: FREQUENCY: CONTINUOUS

## 2018-11-02 ASSESSMENT — PAIN DESCRIPTION - LOCATION: LOCATION: SHOULDER

## 2018-11-02 ASSESSMENT — PAIN SCALES - GENERAL: PAINLEVEL_OUTOF10: 4

## 2018-11-02 ASSESSMENT — PAIN DESCRIPTION - ORIENTATION: ORIENTATION: LEFT

## 2018-11-02 NOTE — PROGRESS NOTES
Physical Therapy  Daily Treatment Note  Date: 2018  Patient Name: Kunal Ashley  MRN: [id number]     :   1965    General  Chart Reviewed: Yes  Response To Previous Treatment: Not applicable  Family / Caregiver Present: No  Referring Practitioner: Beto Rose MD   PT Visit Information  Onset Date: 18  PT Insurance Information: [employer]- [place] PPO   Total # of Visits Approved: 18  Total # of Visits to Date: 6  No Show: 0  Canceled Appointment: 1    Subjective  Pt stated that he feels his shoulder is improving/able to use it more with his ADLs. General Comment  Comments: 9 Weeks Post Op 10/30/18  Pain Screening  Patient Currently in Pain: Yes  Pain Assessment  Pain Assessment: 0-10  Pain Level: 4  Pain Type: Acute pain  Pain Location: Shoulder  Pain Orientation: Left  Pain Descriptors: Constant; Sharp  Pain Frequency: Continuous  Multiple Pain Sites: No     Treatment Activities  Exercises  Exercise 2: Supine PROM (L) UE flexion, ER @ 90-90 and IR @ 90-90 30 sec hold x 3 reps with each one. Exercise 3: Supine (L) shoulder serratus punch 2# 30 reps   Exercise 4: Supine (L) shoulder ABCs x 1 with 2 lbs   Exercise 5: Side lying (R)-(L) shoulder ER 30 reps   Exercise 6: Prone (L) shoulder extension 30 reps   Exercise 7: Prone (L) shoulder rows  30 reps   Exercise 8: Prone (L) shoulder horizontal abduction 2x10  Exercise 9: Standing Rhythmic Stabilization 90* Flexion- Holding Yellow Ball  Up/Down and Side to Side 30 reps with each direction. Activity Tolerance  Patient tolerated treatment well    Assessment  Conditions Requiring Skilled Therapeutic Intervention  Body structures, Functions, Activity limitations: Decreased functional mobility ; Decreased ADL status; Decreased ROM; Decreased strength  Assessment: Pt is improving as evidence by improved PROM in all planes. Demonstrated full PROM with abduction.  Unable to perform the entire exercise program today due to time constraints with the facility

## 2018-11-05 ENCOUNTER — HOSPITAL ENCOUNTER (OUTPATIENT)
Dept: PHYSICAL THERAPY | Age: 53
Setting detail: THERAPIES SERIES
Discharge: HOME | End: 2018-11-05
Payer: COMMERCIAL

## 2018-11-05 PROCEDURE — 97110 THERAPEUTIC EXERCISES: CPT

## 2018-11-05 ASSESSMENT — PAIN SCALES - GENERAL: PAINLEVEL_OUTOF10: 2

## 2018-11-05 ASSESSMENT — PAIN DESCRIPTION - PAIN TYPE: TYPE: ACUTE PAIN

## 2018-11-05 ASSESSMENT — PAIN DESCRIPTION - LOCATION: LOCATION: SHOULDER

## 2018-11-05 ASSESSMENT — PAIN DESCRIPTION - ORIENTATION: ORIENTATION: LEFT

## 2018-11-05 NOTE — PROGRESS NOTES
Physical Therapy  [address]   Outpatient Physical Therapy  [address] [facility]. [address]  Phone: [phone]  Fax: [phone]  Daily Progress Note    Date: 18    Patient Name: Mallika Christian        MRN: [id number]  Account: [de-identified] : 1965      General Information:  Referring Practitioner: Kiersten Robledo MD   Referral Date : 10/10/18  Diagnosis: Arthroscopy of (L) shoulder (Z98.890)  Other (Comment): To See  18  Onset Date: 18  PT Insurance Information: [employer]- [place]PO   Total # of Visits Approved: 18  Total # of Visits to Date: 7  No Show: 0  Canceled Appointment: 1    Subjective  Minimal soreness; notes improvement daily. Trouble getting comfortable at night; also notes increased pain with colder weather     Pain  Patient Currently in Pain: Yes  Pain Assessment: 0-10  Pain Level: 2  Pain Type: Acute pain  Pain Location: Shoulder  Pain Orientation: Left    Objective  Exercise 1: Pulleys Flex/Abd 10 reps ea dir 5\" hold  Exercise 2: Supine PROM (L) UE flexion, ER @ 90-90 and IR @ 90-90 30 sec hold x 3 reps with each one. Exercise 3: Supine (L) shoulder serratus punch 2# 30 reps   Exercise 4: Supine (L) shoulder ABCs x 1 with 2 lbs   Exercise 5: Side lying (R)-(L) shoulder ER 30 reps   Exercise 6: Prone (L) shoulder extension 2 lbs 2x10  Exercise 7: Prone (L) shoulder rows  30 reps   Exercise 8: Prone (L) shoulder horizontal abduction 2x10  Exercise 10: Standing Retro Shrugs with Scapular Retraction 10 lbs 30 reps   Exercise 11: R3 Rows/Pull Downs 3x10  Exercise 12: Side lying (R)- L Shoulder Flexion 2x10  Exercise 13: Small Ball on Wall w/ L UE @ 90* Flexion- Circles cw/ccw 30 seconds each  Exercise 14: Standing Ball on Wall Stretch 3-Ways 5x each  Exercise 16: R1 (L) Shoulder IR/ER @ Neutral 1x10    Comment:  Comments: 10 Weeks Post Op 18    Assessment  Body structures, Functions, Activity limitations: Decreased functional mobility ; Decreased ADL status; Decreased ROM;Decreased strength  Treatment Diagnosis: (L) shoulder weakness/dysfunction.    Prognosis: Excellent  REQUIRES PT FOLLOW UP: Yes    Activity Tolerance  Patient Tolerated treatment well  Comments: emphasis on technique and encouraged working to fatigue without increasing pain, Patient progressing well with therapy Continued difficulty with shoulder flexion against gravity    Plan  Continue with current plan (Progress per RTC protocol)    Therapy Time:  Time In: 0930  Time Out: 1026  Minutes: 56  Timed Code Treatment Minutes: 54 Minutes    Treatment Charges: Minutes Units   []  Ultrasound     []  Electrical-Stim     []  Iontophoresis     []  Traction     []  Massage       []  Eval     []  Gait     []  Vasopneumatic Device     [x]  Ther Exercise 54  4   []  Manual Therapy       []  Ther Activities       []  Aquatics     []  Neuro Re-Ed       []  Other       Total Treatment Time: 47 Enxertos 30, PTA

## 2018-11-07 ENCOUNTER — HOSPITAL ENCOUNTER (OUTPATIENT)
Dept: PHYSICAL THERAPY | Age: 53
Setting detail: THERAPIES SERIES
Discharge: HOME | End: 2018-11-07
Payer: COMMERCIAL

## 2018-11-07 PROCEDURE — 97110 THERAPEUTIC EXERCISES: CPT

## 2018-11-07 ASSESSMENT — PAIN DESCRIPTION - ORIENTATION: ORIENTATION: LEFT;ANTERIOR

## 2018-11-07 ASSESSMENT — PAIN SCALES - GENERAL: PAINLEVEL_OUTOF10: 2

## 2018-11-07 ASSESSMENT — PAIN DESCRIPTION - LOCATION: LOCATION: SHOULDER

## 2018-11-07 ASSESSMENT — PAIN DESCRIPTION - PAIN TYPE: TYPE: ACUTE PAIN

## 2018-11-07 NOTE — PROGRESS NOTES
Physical Therapy  [address] [facility]   Outpatient Physical Therapy  [address] [facility]. [address]  Phone: [phone]  Fax: [phone]  Daily Progress Note    Date: 18    Patient Name: Sol Dos Santos        MRN: [id number]  Account: [de-identified] : 1965      General Information:  Referring Practitioner: Pattricia Goodpasture MD   Referral Date : 10/10/18  Diagnosis: Arthroscopy of (L) shoulder (Z98.890)  Other (Comment): To See  18  Onset Date: 18  PT Insurance Information: [employer]- [place]PO   Total # of Visits Approved: 18  Total # of Visits to Date: 8  No Show: 0  Canceled Appointment: 1    Subjective  Iced after last treatment but feeling good overall- pain minimal     Pain  Patient Currently in Pain: Yes  Pain Assessment: 0-10  Pain Level: 2  Pain Type: Acute pain  Pain Location: Shoulder  Pain Orientation: Left; Anterior    Objective  Exercise 1: Pulleys Flex/Abd 10 reps ea dir 5\" hold  Exercise 2: Supine PROM (L) UE flexion, ER @ 90-90 and IR @ 90-90 30 sec hold x 3 reps with each one. Exercise 3: Supine (L) shoulder serratus punch 3# 30 reps   Exercise 4: Supine (L) shoulder ABCs x 1 with 3 lbs   Exercise 5: Side lying (R)-(L) shoulder ER 30 reps   Exercise 6: Prone (L) shoulder extension 3 lbs 2x10  Exercise 8: Prone (L) shoulder horizontal abduction 2x10  Exercise 9: Standing Rhythmic Stabilization 90* Flexion- Holding Yellow Ball  Up/Down and Side to Side 30 reps with each direction. Exercise 10: Standing Retro Shrugs with Scapular Retraction 10 lbs 30 reps   Exercise 11: R3 Rows/Pull Downs 3x10  Exercise 12: Side lying (R)- L Shoulder Flexion 2x10  Exercise 13: Small Ball on Wall w/ L UE @ 100* Flexion- Circles cw/ccw 30 seconds each  Exercise 14: Standing Ball on Wall Stretch 3-Ways 5x each  Exercise 16: R1 (L) Shoulder IR/ER @ Neutral 2x10  Exercise 18: Add Standing Shoulder Flexion & Scaption as tolerated    Comment:  Comments: 10 Weeks Post Op 18    Assessment:   Body structures, Functions, Activity limitations: Decreased functional mobility ; Decreased ADL status; Decreased ROM; Decreased strength  Treatment Diagnosis: (L) shoulder weakness/dysfunction. Prognosis: Excellent  REQUIRES PT FOLLOW UP: Yes    Activity Tolerance  Patient Tolerated treatment well  Comments: Progressed exercise with resistance and reps as tolerated. Continues with discomfort with supine L shoulder flexion- continued in side lying.  Fatigues easily with ER but overall improving with ROM of L GH joint    Plan  Continue with current plan (Progress per RTC protocol)    Therapy Time:  Time In: 0935  Time Out: 1020  Minutes: 45    Treatment Charges: Minutes Units   []  Ultrasound     []  Electrical-Stim     []  Iontophoresis     []  Traction     []  Massage       []  Eval     []  Gait     []  Vasopneumatic Device     [x]  Ther Exercise 45  3   []  Manual Therapy       []  Ther Activities       []  Aquatics     []  Neuro Re-Ed       []  Other       Total Treatment Time: 39 BRISA Camacho

## 2018-11-09 ENCOUNTER — HOSPITAL ENCOUNTER (OUTPATIENT)
Dept: PHYSICAL THERAPY | Age: 53
Setting detail: THERAPIES SERIES
End: 2018-11-09
Payer: COMMERCIAL

## 2018-11-12 ENCOUNTER — HOSPITAL ENCOUNTER (OUTPATIENT)
Dept: PHYSICAL THERAPY | Age: 53
Setting detail: THERAPIES SERIES
Discharge: HOME | End: 2018-11-12
Payer: COMMERCIAL

## 2018-11-12 PROCEDURE — 97110 THERAPEUTIC EXERCISES: CPT

## 2018-11-12 ASSESSMENT — PAIN DESCRIPTION - PAIN TYPE: TYPE: ACUTE PAIN

## 2018-11-12 ASSESSMENT — PAIN DESCRIPTION - ORIENTATION: ORIENTATION: LEFT;ANTERIOR

## 2018-11-12 ASSESSMENT — PAIN DESCRIPTION - LOCATION: LOCATION: SHOULDER

## 2018-11-12 ASSESSMENT — PAIN SCALES - GENERAL: PAINLEVEL_OUTOF10: 2

## 2018-11-14 ENCOUNTER — HOSPITAL ENCOUNTER (OUTPATIENT)
Dept: PHYSICAL THERAPY | Age: 53
Setting detail: THERAPIES SERIES
Discharge: HOME | End: 2018-11-14
Payer: COMMERCIAL

## 2018-11-14 PROCEDURE — 97110 THERAPEUTIC EXERCISES: CPT

## 2018-11-14 ASSESSMENT — PAIN DESCRIPTION - ORIENTATION: ORIENTATION: LEFT;ANTERIOR

## 2018-11-14 ASSESSMENT — PAIN DESCRIPTION - PAIN TYPE: TYPE: ACUTE PAIN

## 2018-11-14 ASSESSMENT — PAIN SCALES - GENERAL: PAINLEVEL_OUTOF10: 3

## 2018-11-14 ASSESSMENT — PAIN DESCRIPTION - LOCATION: LOCATION: SHOULDER

## 2018-11-16 ENCOUNTER — HOSPITAL ENCOUNTER (OUTPATIENT)
Dept: PHYSICAL THERAPY | Age: 53
Setting detail: THERAPIES SERIES
Discharge: HOME | End: 2018-11-16
Payer: COMMERCIAL

## 2018-11-16 PROCEDURE — 97110 THERAPEUTIC EXERCISES: CPT

## 2018-11-16 ASSESSMENT — PAIN DESCRIPTION - DESCRIPTORS: DESCRIPTORS: CONSTANT;ACHING;DULL;SHARP

## 2018-11-16 ASSESSMENT — PAIN DESCRIPTION - LOCATION: LOCATION: SHOULDER

## 2018-11-16 ASSESSMENT — PAIN DESCRIPTION - ORIENTATION: ORIENTATION: LEFT;ANTERIOR

## 2018-11-16 ASSESSMENT — PAIN DESCRIPTION - PAIN TYPE: TYPE: ACUTE PAIN

## 2018-11-16 ASSESSMENT — PAIN SCALES - GENERAL: PAINLEVEL_OUTOF10: 4

## 2018-11-16 ASSESSMENT — PAIN DESCRIPTION - FREQUENCY: FREQUENCY: CONTINUOUS

## 2018-11-19 ENCOUNTER — OFFICE VISIT (OUTPATIENT)
Dept: FAMILY MEDICINE CLINIC | Age: 53
End: 2018-11-19
Payer: COMMERCIAL

## 2018-11-19 ENCOUNTER — HOSPITAL ENCOUNTER (OUTPATIENT)
Dept: PHYSICAL THERAPY | Age: 53
Setting detail: THERAPIES SERIES
Discharge: HOME | End: 2018-11-19
Payer: COMMERCIAL

## 2018-11-19 VITALS
DIASTOLIC BLOOD PRESSURE: 80 MMHG | RESPIRATION RATE: 14 BRPM | TEMPERATURE: 98 F | SYSTOLIC BLOOD PRESSURE: 120 MMHG | HEART RATE: 74 BPM

## 2018-11-19 DIAGNOSIS — E78.2 MIXED HYPERLIPIDEMIA: ICD-10-CM

## 2018-11-19 DIAGNOSIS — E11.9 TYPE 2 DIABETES MELLITUS WITHOUT COMPLICATION, WITHOUT LONG-TERM CURRENT USE OF INSULIN (HCC): Primary | ICD-10-CM

## 2018-11-19 DIAGNOSIS — M18.12 ARTHRITIS OF CARPOMETACARPAL (CMC) JOINT OF LEFT THUMB: ICD-10-CM

## 2018-11-19 DIAGNOSIS — Z01.818 PRE-OP TESTING: ICD-10-CM

## 2018-11-19 DIAGNOSIS — K52.9 CHRONIC DIARRHEA: ICD-10-CM

## 2018-11-19 PROCEDURE — 99214 OFFICE O/P EST MOD 30 MIN: CPT | Performed by: FAMILY MEDICINE

## 2018-11-19 PROCEDURE — 97110 THERAPEUTIC EXERCISES: CPT

## 2018-11-19 ASSESSMENT — ENCOUNTER SYMPTOMS
ABDOMINAL PAIN: 0
SHORTNESS OF BREATH: 0
CHEST TIGHTNESS: 0
DIARRHEA: 1
BLOOD IN STOOL: 0

## 2018-11-19 ASSESSMENT — PAIN DESCRIPTION - PAIN TYPE: TYPE: ACUTE PAIN

## 2018-11-19 ASSESSMENT — PAIN SCALES - GENERAL: PAINLEVEL_OUTOF10: 3

## 2018-11-19 ASSESSMENT — PAIN DESCRIPTION - ORIENTATION: ORIENTATION: LEFT;ANTERIOR

## 2018-11-19 ASSESSMENT — PAIN DESCRIPTION - LOCATION: LOCATION: SHOULDER

## 2018-11-20 LAB
AVERAGE GLUCOSE: 131
BASOPHILS ABSOLUTE: NORMAL /ΜL
BASOPHILS RELATIVE PERCENT: NORMAL %
BUN BLDV-MCNC: 11 MG/DL
CALCIUM SERPL-MCNC: 9.4 MG/DL
CHLORIDE BLD-SCNC: 102 MMOL/L
CO2: 26 MMOL/L
CREAT SERPL-MCNC: 0.9 MG/DL
CREATININE URINE: 132.83 MG/DL
EOSINOPHILS ABSOLUTE: NORMAL /ΜL
EOSINOPHILS RELATIVE PERCENT: NORMAL %
GFR CALCULATED: >60
GLUCOSE BLD-MCNC: 111 MG/DL
HBA1C MFR BLD: 6.2 %
HCT VFR BLD CALC: 46.5 % (ref 41–53)
HEMOGLOBIN: 15.9 G/DL (ref 13.5–17.5)
LYMPHOCYTES ABSOLUTE: NORMAL /ΜL
LYMPHOCYTES RELATIVE PERCENT: NORMAL %
MCH RBC QN AUTO: 31.4 PG
MCHC RBC AUTO-ENTMCNC: 34.2 G/DL
MCV RBC AUTO: 92 FL
MICROALBUMIN/CREAT 24H UR: <0.7 MG/G{CREAT}
MONOCYTES ABSOLUTE: NORMAL /ΜL
MONOCYTES RELATIVE PERCENT: NORMAL %
NEUTROPHILS ABSOLUTE: NORMAL /ΜL
NEUTROPHILS RELATIVE PERCENT: NORMAL %
PLATELET # BLD: 284 K/ΜL
PMV BLD AUTO: 8 FL
POTASSIUM SERPL-SCNC: 4.2 MMOL/L
RBC # BLD: 5.06 10^6/ΜL
SODIUM BLD-SCNC: 136 MMOL/L
WBC # BLD: 4.9 10^3/ML

## 2018-11-21 ENCOUNTER — HOSPITAL ENCOUNTER (OUTPATIENT)
Dept: PHYSICAL THERAPY | Age: 53
Setting detail: THERAPIES SERIES
Discharge: HOME | End: 2018-11-21
Payer: COMMERCIAL

## 2018-11-21 ENCOUNTER — OFFICE VISIT (OUTPATIENT)
Dept: ORTHOPEDIC SURGERY | Age: 53
End: 2018-11-21

## 2018-11-21 DIAGNOSIS — Z01.818 PRE-OP TESTING: ICD-10-CM

## 2018-11-21 DIAGNOSIS — E78.2 MIXED HYPERLIPIDEMIA: ICD-10-CM

## 2018-11-21 DIAGNOSIS — Z98.890 S/P ARTHROSCOPY OF LEFT SHOULDER: Primary | ICD-10-CM

## 2018-11-21 DIAGNOSIS — E11.9 TYPE 2 DIABETES MELLITUS WITHOUT COMPLICATION, WITHOUT LONG-TERM CURRENT USE OF INSULIN (HCC): ICD-10-CM

## 2018-11-21 PROCEDURE — 99024 POSTOP FOLLOW-UP VISIT: CPT | Performed by: ORTHOPAEDIC SURGERY

## 2018-11-21 PROCEDURE — 97110 THERAPEUTIC EXERCISES: CPT

## 2018-11-21 ASSESSMENT — PAIN DESCRIPTION - LOCATION: LOCATION: SHOULDER

## 2018-11-21 ASSESSMENT — PAIN DESCRIPTION - ORIENTATION: ORIENTATION: LEFT;ANTERIOR

## 2018-11-21 ASSESSMENT — PAIN SCALES - GENERAL: PAINLEVEL_OUTOF10: 3

## 2018-11-21 ASSESSMENT — PAIN DESCRIPTION - PAIN TYPE: TYPE: ACUTE PAIN

## 2018-11-21 NOTE — PROGRESS NOTES
Arthur Merlos M.D.            [address] [place] [address]., [address] [facility],[address], [address] [facility]             Dept Phone: [phone]             Dept Fax:  [phone]     Max turns today status post rotator cuff repair left shoulder. She's he has no major complaints. Physical examination notes he can almost get full overhead forward flexion and abduction. External rotation about 65. His abduction strength is about 90% external rotation percent is close to 100    Impression  Status post mini open rotator cuff repair left shoulder just short of 3 months    Plan  Patient is continuing outpatient physical therapy for several more weeks. He wishes return to work on 1/2/19. 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 HENT: Negative. Respiratory: Negative. Cardiovascular: Negative. Neurological: Negative. Musculoskeletal:  Shoulder Pain (left arthroscopy 8-28-18)          Current Outpatient Prescriptions:     metFORMIN (GLUCOPHAGE) 500 MG tablet, Take 1 tablet by mouth 2 times daily (with meals), Disp: 180 tablet, Rfl: 1    tamsulosin (FLOMAX) 0.4 MG capsule, Take 1 capsule by mouth daily, Disp: 90 capsule, Rfl: 3    atorvastatin (LIPITOR) 20 MG tablet, TAKE 1 TABLET BY MOUTH ONE TIME A DAY, Disp: 90 tablet, Rfl: 1    oxyCODONE-acetaminophen (PERCOCET) 5-325 MG per tablet, Take 1-2 tablets by mouth every 4 hours as needed for Pain. ., Disp: 40 tablet, Rfl: 0    naproxen (NAPROSYN) 500 MG tablet, Take 1 tablet by mouth 2 times daily as needed for Pain (Take with food), Disp: 20 tablet, Rfl: 0    No Known Allergies    Social History     Social History    Marital status:      Spouse name: N/A    Number of children: N/A    Years of education: N/A     Occupational History    Not on file.      Social History Main Topics    Smoking status: Former Smoker     Packs/day: 0.50     Years: 6.00     Types: Cigarettes

## 2018-11-27 ENCOUNTER — TELEPHONE (OUTPATIENT)
Dept: FAMILY MEDICINE CLINIC | Age: 53
End: 2018-11-27

## 2018-11-27 DIAGNOSIS — Z01.818 PRE-OP TESTING: ICD-10-CM

## 2018-11-27 DIAGNOSIS — R94.31 ABNORMAL EKG: Primary | ICD-10-CM

## 2018-11-27 NOTE — TELEPHONE ENCOUNTER
Informed the patient that I would try to get him in to see Dr Sneha Martel for cardiac clearance and call him back with the information.     Appt with Dr Sneha Martel tomorrow at 1:00pm.

## 2019-02-01 ENCOUNTER — TELEPHONE (OUTPATIENT)
Dept: GASTROENTEROLOGY | Age: 54
End: 2019-02-01

## 2019-02-19 ENCOUNTER — OFFICE VISIT (OUTPATIENT)
Dept: FAMILY MEDICINE CLINIC | Age: 54
End: 2019-02-19
Payer: COMMERCIAL

## 2019-02-19 VITALS
RESPIRATION RATE: 13 BRPM | DIASTOLIC BLOOD PRESSURE: 80 MMHG | SYSTOLIC BLOOD PRESSURE: 120 MMHG | HEART RATE: 56 BPM | BODY MASS INDEX: 32.26 KG/M2 | WEIGHT: 176.4 LBS

## 2019-02-19 DIAGNOSIS — E66.9 OBESITY (BMI 30.0-34.9): ICD-10-CM

## 2019-02-19 DIAGNOSIS — E11.9 TYPE 2 DIABETES MELLITUS WITHOUT COMPLICATION, WITHOUT LONG-TERM CURRENT USE OF INSULIN (HCC): Primary | ICD-10-CM

## 2019-02-19 DIAGNOSIS — E78.2 MIXED HYPERLIPIDEMIA: ICD-10-CM

## 2019-02-19 PROCEDURE — 99214 OFFICE O/P EST MOD 30 MIN: CPT | Performed by: FAMILY MEDICINE

## 2019-02-19 ASSESSMENT — PATIENT HEALTH QUESTIONNAIRE - PHQ9
SUM OF ALL RESPONSES TO PHQ QUESTIONS 1-9: 0
2. FEELING DOWN, DEPRESSED OR HOPELESS: 0
SUM OF ALL RESPONSES TO PHQ QUESTIONS 1-9: 0
1. LITTLE INTEREST OR PLEASURE IN DOING THINGS: 0
SUM OF ALL RESPONSES TO PHQ9 QUESTIONS 1 & 2: 0

## 2019-02-19 ASSESSMENT — ENCOUNTER SYMPTOMS
SHORTNESS OF BREATH: 0
BLOOD IN STOOL: 0
ABDOMINAL PAIN: 0
CHEST TIGHTNESS: 0

## 2019-02-21 RX ORDER — ATORVASTATIN CALCIUM 20 MG/1
TABLET, FILM COATED ORAL
Qty: 90 TABLET | Refills: 1 | Status: SHIPPED | OUTPATIENT
Start: 2019-02-21 | End: 2019-05-28 | Stop reason: SDUPTHER

## 2019-03-06 LAB
ALT SERPL-CCNC: 38 U/L
AST SERPL-CCNC: 31 U/L
AVERAGE GLUCOSE: 134
BUN BLDV-MCNC: 12 MG/DL
CALCIUM SERPL-MCNC: 9.3 MG/DL
CHLORIDE BLD-SCNC: 104 MMOL/L
CHOLESTEROL, TOTAL: 136 MG/DL
CHOLESTEROL/HDL RATIO: 2.8
CO2: 26 MMOL/L
CREAT SERPL-MCNC: 0.86 MG/DL
GFR CALCULATED: >60
GLUCOSE BLD-MCNC: 121 MG/DL
HBA1C MFR BLD: 6.3 %
HDLC SERPL-MCNC: 48 MG/DL (ref 35–70)
LDL CHOLESTEROL CALCULATED: 67 MG/DL (ref 0–160)
MAGNESIUM: 2.2 MG/DL
POTASSIUM SERPL-SCNC: 4.3 MMOL/L
SODIUM BLD-SCNC: 139 MMOL/L
TRIGL SERPL-MCNC: 107 MG/DL
VLDLC SERPL CALC-MCNC: 21 MG/DL

## 2019-03-07 DIAGNOSIS — E11.9 TYPE 2 DIABETES MELLITUS WITHOUT COMPLICATION, WITHOUT LONG-TERM CURRENT USE OF INSULIN (HCC): ICD-10-CM

## 2019-03-07 DIAGNOSIS — E78.2 MIXED HYPERLIPIDEMIA: ICD-10-CM

## 2019-05-28 RX ORDER — ATORVASTATIN CALCIUM 20 MG/1
TABLET, FILM COATED ORAL
Qty: 90 TABLET | Refills: 1 | Status: SHIPPED | OUTPATIENT
Start: 2019-05-28 | End: 2019-09-26 | Stop reason: SDUPTHER

## 2019-08-20 ENCOUNTER — OFFICE VISIT (OUTPATIENT)
Dept: FAMILY MEDICINE CLINIC | Age: 54
End: 2019-08-20
Payer: COMMERCIAL

## 2019-08-20 VITALS
DIASTOLIC BLOOD PRESSURE: 80 MMHG | BODY MASS INDEX: 32.37 KG/M2 | WEIGHT: 177 LBS | TEMPERATURE: 97.8 F | RESPIRATION RATE: 16 BRPM | HEART RATE: 56 BPM | SYSTOLIC BLOOD PRESSURE: 118 MMHG

## 2019-08-20 DIAGNOSIS — J01.40 ACUTE NON-RECURRENT PANSINUSITIS: Primary | ICD-10-CM

## 2019-08-20 PROCEDURE — 99213 OFFICE O/P EST LOW 20 MIN: CPT | Performed by: NURSE PRACTITIONER

## 2019-08-20 RX ORDER — AMOXICILLIN AND CLAVULANATE POTASSIUM 875; 125 MG/1; MG/1
1 TABLET, FILM COATED ORAL 2 TIMES DAILY
Qty: 20 TABLET | Refills: 0 | Status: SHIPPED | OUTPATIENT
Start: 2019-08-20 | End: 2019-08-30

## 2019-08-20 RX ORDER — LORATADINE 10 MG/1
10 TABLET ORAL DAILY
Qty: 30 TABLET | Refills: 1 | Status: SHIPPED | OUTPATIENT
Start: 2019-08-20 | End: 2021-03-22

## 2019-08-20 RX ORDER — GUAIFENESIN AND DEXTROMETHORPHAN HYDROBROMIDE 600; 30 MG/1; MG/1
1 TABLET, EXTENDED RELEASE ORAL 2 TIMES DAILY
Qty: 28 TABLET | Refills: 0 | Status: SHIPPED | OUTPATIENT
Start: 2019-08-20 | End: 2019-09-26 | Stop reason: ALTCHOICE

## 2019-08-20 ASSESSMENT — ENCOUNTER SYMPTOMS
NAUSEA: 0
ABDOMINAL PAIN: 0
RHINORRHEA: 1
SHORTNESS OF BREATH: 0
VOMITING: 0
COUGH: 1
SINUS PRESSURE: 1

## 2019-08-20 NOTE — PROGRESS NOTES
Subjective:      Patient ID: Erna Tomlin is a 52 y.o. male. Visit Information    Have you changed or started any medications since your last visit including any over-the-counter medicines, vitamins, or herbal medicines? no   Are you having any side effects from any of your medications? -  no  Have you stopped taking any of your medications? Is so, why? -  no    Have you seen any other physician or provider since your last visit? No  Have you had any other diagnostic tests since your last visit? Yes - Records Obtained  Have you been seen in the emergency room and/or had an admission to a hospital since we last saw you? Yes - Records Obtained  Have you had your routine dental cleaning in the past 6 months? no    Have you activated your [employer] account? If not, what are your barriers? No:      Patient Care Team:  Justin Rockwell MD as PCP - General (Family Medicine)  Justin Rockwell MD [facility]  Rosi Asher MD as Consulting Physician (Gastroenterology)    Medical History Review  Past Medical, Family, and Social History reviewed and does not contribute to the patient presenting condition    Health Maintenance   Topic Date Due    Hepatitis C screen  1965    Pneumococcal 0-64 years Vaccine (1 of 1 - PPSV23) 10/12/1969    HIV screen  10/12/1978    Hepatitis B Vaccine (1 of 3 - Risk 3-dose series) 10/12/1982    DTaP/Tdap/Td vaccine (1 - Tdap) 10/12/1982    Shingles Vaccine (1 of 2) 10/12/2013    Diabetic foot exam  06/09/2017    Flu vaccine (1) 09/01/2019    Diabetic microalbuminuria test  11/20/2019    A1C test (Diabetic or Prediabetic)  03/06/2020    Lipid screen  03/06/2020    Diabetic retinal exam  04/16/2020    Colon cancer screen colonoscopy  12/02/2024      HPI     52year old male presents with fever chills headache sore throat nasal congestion/pressure post nasal drainage cough for 5 days. he says cough is productive with yellow sputum.  states his Lab Results   Component Value Date    CALCIUM 9.3 03/06/2019     Lab Results   Component Value Date    LDLCALC 67 03/06/2019       1. Acute non-recurrent pansinusitis  - amoxicillin-clavulanate (AUGMENTIN) 875-125 MG per tablet; Take 1 tablet by mouth 2 times daily for 10 days  Dispense: 20 tablet; Refill: 0  - Dextromethorphan-guaiFENesin (MUCINEX DM)  MG TB12; Take 1 tablet by mouth 2 times daily  Dispense: 28 tablet; Refill: 0  - claritin for nasal congestion  - increase fluid and rest   - enc to call if symptoms are not improving. Requested Prescriptions     Signed Prescriptions Disp Refills    amoxicillin-clavulanate (AUGMENTIN) 875-125 MG per tablet 20 tablet 0     Sig: Take 1 tablet by mouth 2 times daily for 10 days    Dextromethorphan-guaiFENesin (MUCINEX DM)  MG TB12 28 tablet 0     Sig: Take 1 tablet by mouth 2 times daily       Medications Discontinued During This Encounter   Medication Reason    tamsulosin (FLOMAX) 0.4 MG capsule ERROR    naproxen (NAPROSYN) 500 MG tablet     oxyCODONE-acetaminophen (PERCOCET) 5-325 MG per tablet LIST CLEANUP       Discussed use, benefit, and side effects of prescribed medications. Barriers to medication compliance addressed. All patient questions answered. Pt voiced understanding. No follow-ups on file.

## 2019-09-26 ENCOUNTER — OFFICE VISIT (OUTPATIENT)
Dept: FAMILY MEDICINE CLINIC | Age: 54
End: 2019-09-26
Payer: COMMERCIAL

## 2019-09-26 VITALS
BODY MASS INDEX: 32.94 KG/M2 | DIASTOLIC BLOOD PRESSURE: 80 MMHG | HEART RATE: 64 BPM | RESPIRATION RATE: 14 BRPM | WEIGHT: 179 LBS | SYSTOLIC BLOOD PRESSURE: 120 MMHG | HEIGHT: 62 IN

## 2019-09-26 DIAGNOSIS — E66.9 OBESITY (BMI 30.0-34.9): ICD-10-CM

## 2019-09-26 DIAGNOSIS — E11.9 TYPE 2 DIABETES MELLITUS WITHOUT COMPLICATION, WITHOUT LONG-TERM CURRENT USE OF INSULIN (HCC): Primary | ICD-10-CM

## 2019-09-26 DIAGNOSIS — M25.50 ARTHRALGIA, UNSPECIFIED JOINT: ICD-10-CM

## 2019-09-26 DIAGNOSIS — E78.2 MIXED HYPERLIPIDEMIA: ICD-10-CM

## 2019-09-26 LAB — HBA1C MFR BLD: 6.2 %

## 2019-09-26 PROCEDURE — 83036 HEMOGLOBIN GLYCOSYLATED A1C: CPT | Performed by: FAMILY MEDICINE

## 2019-09-26 PROCEDURE — 99214 OFFICE O/P EST MOD 30 MIN: CPT | Performed by: FAMILY MEDICINE

## 2019-09-26 RX ORDER — ATORVASTATIN CALCIUM 20 MG/1
TABLET, FILM COATED ORAL
Qty: 90 TABLET | Refills: 1 | Status: SHIPPED | OUTPATIENT
Start: 2019-09-26 | End: 2020-01-13 | Stop reason: SDUPTHER

## 2019-09-26 ASSESSMENT — ENCOUNTER SYMPTOMS
SHORTNESS OF BREATH: 0
BLOOD IN STOOL: 0
ABDOMINAL PAIN: 0
CHEST TIGHTNESS: 0

## 2019-09-26 NOTE — PROGRESS NOTES
A1C)     DIABETES FOOT EXAM     Orders Placed This Encounter   Medications    metFORMIN (GLUCOPHAGE) 500 MG tablet     Sig: Take 1 tablet by mouth 2 times daily (with meals)     Dispense:  180 tablet     Refill:  1    atorvastatin (LIPITOR) 20 MG tablet     Sig: TAKE 1 TABLET BY MOUTH ONE TIME A DAY     Dispense:  90 tablet     Refill:  1     Continue routine medication  Patient referred to rheumatology  Follow-up in 6 months or sooner if needed

## 2019-09-30 LAB
ALBUMIN SERPL-MCNC: 4.5 G/DL
ALP BLD-CCNC: 81 U/L
ALT SERPL-CCNC: 28 U/L
ANA TITER: NORMAL
ANION GAP SERPL CALCULATED.3IONS-SCNC: 13 MMOL/L
AST SERPL-CCNC: 24 U/L
BILIRUB SERPL-MCNC: 0.5 MG/DL (ref 0.1–1.4)
BUN BLDV-MCNC: 11 MG/DL
C-REACTIVE PROTEIN: <0.1
CALCIUM SERPL-MCNC: 9.2 MG/DL
CHLORIDE BLD-SCNC: 101 MMOL/L
CHOLESTEROL, TOTAL: 145 MG/DL
CHOLESTEROL/HDL RATIO: 2.4
CO2: 25 MMOL/L
CREAT SERPL-MCNC: 0.83 MG/DL
CREATININE URINE: 35.72 MG/DL
GFR CALCULATED: >60
GLUCOSE BLD-MCNC: 118 MG/DL
HDLC SERPL-MCNC: 60 MG/DL (ref 35–70)
LDL CHOLESTEROL CALCULATED: 64 MG/DL (ref 0–160)
MAGNESIUM: 2 MG/DL
MICROALBUMIN/CREAT 24H UR: <0.7 MG/G{CREAT}
POTASSIUM SERPL-SCNC: 4.3 MMOL/L
RHEUMATOID FACTOR: <10
SEDIMENTATION RATE, ERYTHROCYTE: 4
SODIUM BLD-SCNC: 139 MMOL/L
TOTAL PROTEIN: 7.3
TRIGL SERPL-MCNC: 104 MG/DL
TSH SERPL DL<=0.05 MIU/L-ACNC: 2.84 UIU/ML
URIC ACID: 6.2
VLDLC SERPL CALC-MCNC: 21 MG/DL

## 2019-10-02 DIAGNOSIS — E11.9 TYPE 2 DIABETES MELLITUS WITHOUT COMPLICATION, WITHOUT LONG-TERM CURRENT USE OF INSULIN (HCC): ICD-10-CM

## 2019-10-02 DIAGNOSIS — E78.2 MIXED HYPERLIPIDEMIA: ICD-10-CM

## 2019-10-02 DIAGNOSIS — M25.50 ARTHRALGIA, UNSPECIFIED JOINT: ICD-10-CM

## 2019-10-15 DIAGNOSIS — M79.672 FOOT PAIN, BILATERAL: Primary | ICD-10-CM

## 2019-10-15 DIAGNOSIS — M79.671 FOOT PAIN, BILATERAL: Primary | ICD-10-CM

## 2019-10-28 ENCOUNTER — OFFICE VISIT (OUTPATIENT)
Dept: PODIATRY | Age: 54
End: 2019-10-28
Payer: COMMERCIAL

## 2019-10-28 VITALS — WEIGHT: 175 LBS | HEIGHT: 62 IN | BODY MASS INDEX: 32.2 KG/M2

## 2019-10-28 DIAGNOSIS — M79.671 PAIN IN RIGHT FOOT: ICD-10-CM

## 2019-10-28 DIAGNOSIS — M72.2 PLANTAR FASCIITIS OF RIGHT FOOT: Primary | ICD-10-CM

## 2019-10-28 PROCEDURE — 99213 OFFICE O/P EST LOW 20 MIN: CPT | Performed by: PODIATRIST

## 2019-10-28 PROCEDURE — 20550 NJX 1 TENDON SHEATH/LIGAMENT: CPT | Performed by: PODIATRIST

## 2019-10-28 RX ORDER — BUPIVACAINE HYDROCHLORIDE 5 MG/ML
1 INJECTION, SOLUTION PERINEURAL ONCE
Status: COMPLETED | OUTPATIENT
Start: 2019-10-28 | End: 2019-10-28

## 2019-10-28 RX ADMIN — BUPIVACAINE HYDROCHLORIDE 5 MG: 5 INJECTION, SOLUTION PERINEURAL at 11:34

## 2019-10-28 ASSESSMENT — ENCOUNTER SYMPTOMS
NAUSEA: 0
DIARRHEA: 0
COLOR CHANGE: 0
SHORTNESS OF BREATH: 0
BACK PAIN: 0

## 2020-01-13 ENCOUNTER — OFFICE VISIT (OUTPATIENT)
Dept: FAMILY MEDICINE CLINIC | Age: 55
End: 2020-01-13
Payer: COMMERCIAL

## 2020-01-13 VITALS
WEIGHT: 183 LBS | SYSTOLIC BLOOD PRESSURE: 118 MMHG | BODY MASS INDEX: 33.68 KG/M2 | DIASTOLIC BLOOD PRESSURE: 70 MMHG | HEART RATE: 60 BPM | HEIGHT: 62 IN | TEMPERATURE: 97.7 F

## 2020-01-13 PROBLEM — R74.8 ELEVATED CK: Status: ACTIVE | Noted: 2020-01-13

## 2020-01-13 PROCEDURE — 99214 OFFICE O/P EST MOD 30 MIN: CPT | Performed by: FAMILY MEDICINE

## 2020-01-13 PROCEDURE — 90732 PPSV23 VACC 2 YRS+ SUBQ/IM: CPT | Performed by: FAMILY MEDICINE

## 2020-01-13 PROCEDURE — 90471 IMMUNIZATION ADMIN: CPT | Performed by: FAMILY MEDICINE

## 2020-01-13 RX ORDER — ATORVASTATIN CALCIUM 20 MG/1
TABLET, FILM COATED ORAL
Qty: 90 TABLET | Refills: 1 | Status: SHIPPED | OUTPATIENT
Start: 2020-01-13 | End: 2020-09-30 | Stop reason: SDUPTHER

## 2020-01-13 RX ORDER — FAMOTIDINE 20 MG/1
20 TABLET, FILM COATED ORAL 2 TIMES DAILY
Qty: 60 TABLET | Refills: 5 | Status: SHIPPED | OUTPATIENT
Start: 2020-01-13 | End: 2021-03-22

## 2020-01-13 ASSESSMENT — ENCOUNTER SYMPTOMS
SHORTNESS OF BREATH: 0
CHEST TIGHTNESS: 0
VOMITING: 0
ABDOMINAL PAIN: 1
DIARRHEA: 0
BLOOD IN STOOL: 0

## 2020-01-13 ASSESSMENT — PATIENT HEALTH QUESTIONNAIRE - PHQ9
2. FEELING DOWN, DEPRESSED OR HOPELESS: 0
SUM OF ALL RESPONSES TO PHQ9 QUESTIONS 1 & 2: 0
SUM OF ALL RESPONSES TO PHQ QUESTIONS 1-9: 0
SUM OF ALL RESPONSES TO PHQ QUESTIONS 1-9: 0
1. LITTLE INTEREST OR PLEASURE IN DOING THINGS: 0

## 2020-01-13 NOTE — PROGRESS NOTES
Subjective:      Patient ID: Curly Ventura is a 62 y.o. male. Visit Information    Have you changed or started any medications since your last visit including any over-the-counter medicines, vitamins, or herbal medicines? no   Are you having any side effects from any of your medications? -  no  Have you stopped taking any of your medications? Is so, why? -  no    Have you seen any other physician or provider since your last visit? No  Have you had any other diagnostic tests since your last visit? No  Have you been seen in the emergency room and/or had an admission to a hospital since we last saw you? No  Have you had your routine dental cleaning in the past 6 months? yes    Have you activated your [employer] account? If not, what are your barriers? No     Patient Care Team:  Narayan Simmons MD as PCP - General (Family Medicine)  Narayan Simmons MD [facility]CP [facility]  Abhi Fermin MD as Consulting Physician (Gastroenterology)    Medical History Review  Past Medical, Family, and Social History reviewed and does not contribute to the patient presenting condition    Health Maintenance   Topic Date Due    Hepatitis C screen  1965    Pneumococcal 0-64 years Vaccine (1 of 1 - PPSV23) 10/12/1969    DTaP/Tdap/Td vaccine (1 - Tdap) 10/12/1974    HIV screen  10/12/1978    Hepatitis B vaccine (1 of 3 - Risk 3-dose series) 10/12/1982    Shingles Vaccine (1 of 2) 10/12/2013    Diabetic retinal exam  04/16/2020    Diabetic foot exam  09/26/2020    A1C test (Diabetic or Prediabetic)  09/26/2020    Diabetic microalbuminuria test  09/30/2020    Lipid screen  09/30/2020    Colon cancer screen colonoscopy  12/02/2024    Flu vaccine  Completed     HPI  Patient is a 66-year-old obese  male who presents for diabetes, hyperlipidemia. He also complains of having epigastric pain for the past 3 weeks. Patient has a history of gastritis and esophagitis.   He states that sometimes he has muscle aches in his legs. He states he is taking and tolerating his routine medication. He denies any chest pain, shortness of breath, fever, chills, vomiting or diarrhea. He states he has a good appetite and remains active. Review of Systems   Constitutional: Negative for chills and fever. HENT: Negative for congestion. Respiratory: Negative for chest tightness and shortness of breath. Cardiovascular: Negative for chest pain. Gastrointestinal: Positive for abdominal pain (Epigastric). Negative for blood in stool, diarrhea and vomiting. Genitourinary: Negative for dysuria and hematuria. Musculoskeletal: Positive for myalgias (  Occasional). Skin: Negative for rash. Neurological: Negative for dizziness. Psychiatric/Behavioral: Negative for dysphoric mood. Objective:   Physical Exam    Assessment:       Diagnosis Orders   1. Type 2 diabetes mellitus without complication, without long-term current use of insulin ([facility])  Comprehensive Metabolic Panel    Lipid Panel    Magnesium    Hemoglobin A1C   2. Mixed hyperlipidemia  atorvastatin (LIPITOR) 20 MG tablet    Comprehensive Metabolic Panel    Lipid Panel    Magnesium   3. Gastritis, presence of bleeding unspecified, unspecified chronicity, unspecified gastritis type  famotidine (PEPCID) 20 MG tablet   4. Epigastric pain  famotidine (PEPCID) 20 MG tablet   5. Obesity (BMI 30.0-34. 9)             Plan:       Michael José received counseling on the following healthy behaviors: nutrition and medication adherence  Reviewed prior labs and health maintenance  Continue current medications, diet and exercise. Discussed use, benefit, and side effects of prescribed medications. Barriers to medication compliance addressed. Patient given educational materials - see patient instructions  Was a self-tracking handout given in paper form or via [employer]?  No:     Requested Prescriptions     Signed Prescriptions Disp Refills    atorvastatin (LIPITOR) 20 MG tablet 90 tablet 1     Sig: TAKE 1 TABLET BY MOUTH ONE TIME A DAY    famotidine (PEPCID) 20 MG tablet 60 tablet 5     Sig: Take 1 tablet by mouth 2 times daily       All patient questions answered. Patient voiced understanding. Quality Measures    Body mass index is 33.46 kg/m². Elevated. Weight control planned discussed Healthy diet and regular exercise. BP: 118/70 Blood pressure is normal. Treatment plan consists of Weight Reduction. Lab Results   Component Value Date    LDLCALC 64 09/30/2019    (goal LDL reduction with dx if diabetes is 50% LDL reduction)      PHQ Scores 1/13/2020 2/19/2019 7/19/2018 5/4/2017   PHQ2 Score 0 0 0 0   PHQ9 Score 0 0 0 0     Interpretation of Total Score Depression Severity: 1-4 = Minimal depression, 5-9 = Mild depression, 10-14 = Moderate depression, 15-19 = Moderately severe depression, 20-27 = Severe depression  Orders Placed This Encounter   Procedures    Pneumococcal polysaccharide vaccine 23-valent greater than or equal to 1yo subcutaneous/IM    Comprehensive Metabolic Panel     Fasting     Standing Status:   Future     Standing Expiration Date:   1/13/2021    Lipid Panel     Standing Status:   Future     Standing Expiration Date:   1/13/2021     Order Specific Question:   Is Patient Fasting?/# of Hours     Answer:    Fast 8-10 hours    Magnesium     Standing Status:   Future     Standing Expiration Date:   1/13/2021    Hemoglobin A1C     Standing Status:   Future     Standing Expiration Date:   1/13/2021     Orders Placed This Encounter   Medications    atorvastatin (LIPITOR) 20 MG tablet     Sig: TAKE 1 TABLET BY MOUTH ONE TIME A DAY     Dispense:  90 tablet     Refill:  1    famotidine (PEPCID) 20 MG tablet     Sig: Take 1 tablet by mouth 2 times daily     Dispense:  60 tablet     Refill:  5         Started on Pepcid 20 mg twice daily  Patient also advised to decrease his Lipitor dose half tablet or 10 mg daily if he has myalgias  Continue other routine

## 2020-02-24 LAB
ALBUMIN SERPL-MCNC: 4.3 G/DL
ALP BLD-CCNC: 80 U/L
ALT SERPL-CCNC: 26 U/L
ANION GAP SERPL CALCULATED.3IONS-SCNC: 7 MMOL/L
AST SERPL-CCNC: 23 U/L
AVERAGE GLUCOSE: 143
BILIRUB SERPL-MCNC: 0.5 MG/DL (ref 0.1–1.4)
BUN BLDV-MCNC: 16 MG/DL
CALCIUM SERPL-MCNC: 8.7 MG/DL
CHLORIDE BLD-SCNC: 103 MMOL/L
CHOLESTEROL, TOTAL: 152 MG/DL
CHOLESTEROL/HDL RATIO: 3
CO2: 25 MMOL/L
CREAT SERPL-MCNC: 0.96 MG/DL
GFR CALCULATED: >60
GLUCOSE BLD-MCNC: 118 MG/DL
HBA1C MFR BLD: 6.6 %
HDLC SERPL-MCNC: 51 MG/DL (ref 35–70)
LDL CHOLESTEROL CALCULATED: 80 MG/DL (ref 0–160)
MAGNESIUM: 2 MG/DL
POTASSIUM SERPL-SCNC: 4.3 MMOL/L
SODIUM BLD-SCNC: 135 MMOL/L
TOTAL PROTEIN: 7
TRIGL SERPL-MCNC: 103 MG/DL
VLDLC SERPL CALC-MCNC: 21 MG/DL

## 2020-04-20 ENCOUNTER — TELEPHONE (OUTPATIENT)
Dept: FAMILY MEDICINE CLINIC | Age: 55
End: 2020-04-20

## 2020-09-25 ENCOUNTER — TELEPHONE (OUTPATIENT)
Dept: FAMILY MEDICINE CLINIC | Age: 55
End: 2020-09-25

## 2020-09-25 NOTE — TELEPHONE ENCOUNTER
Patient is requesting lab orders to be placed prior to his appointment on Monday 10/26/2020. Caller advised that he would like his thyroid and sugar checked as well as cholesterol and anything else that is due. Last labs done 02/24/20 included cmp, lipid, magnesium, a1c. Would like orders faxed to [facility] [place].     Please advise

## 2020-09-28 LAB
ALT SERPL-CCNC: 21 U/L
AST SERPL-CCNC: 31 U/L
AVERAGE GLUCOSE: 126
BUN BLDV-MCNC: 21 MG/DL
CALCIUM SERPL-MCNC: 9.1 MG/DL
CHLORIDE BLD-SCNC: 105 MMOL/L
CHOLESTEROL, TOTAL: 201 MG/DL
CHOLESTEROL/HDL RATIO: 3.6
CO2: 26 MMOL/L
CREAT SERPL-MCNC: 0.82 MG/DL
GFR CALCULATED: >60
GLUCOSE BLD-MCNC: 116 MG/DL
HBA1C MFR BLD: 6 %
HDLC SERPL-MCNC: 56 MG/DL (ref 35–70)
LDL CHOLESTEROL CALCULATED: 131 MG/DL (ref 0–160)
NONHDLC SERPL-MCNC: ABNORMAL MG/DL
POTASSIUM SERPL-SCNC: 4.3 MMOL/L
SODIUM BLD-SCNC: 139 MMOL/L
TRIGL SERPL-MCNC: 70 MG/DL
TSH SERPL DL<=0.05 MIU/L-ACNC: 2.5 UIU/ML
VLDLC SERPL CALC-MCNC: 14 MG/DL

## 2020-09-30 ENCOUNTER — TELEPHONE (OUTPATIENT)
Dept: FAMILY MEDICINE CLINIC | Age: 55
End: 2020-09-30

## 2020-09-30 RX ORDER — ATORVASTATIN CALCIUM 20 MG/1
TABLET, FILM COATED ORAL
Qty: 90 TABLET | Refills: 1 | Status: SHIPPED | OUTPATIENT
Start: 2020-09-30 | End: 2021-04-01

## 2020-09-30 NOTE — TELEPHONE ENCOUNTER
SASHA:  I spoke with Mana Newby (wife) and gave her lab results dated 9-28-20. Wife states he is not taking his Metformin anymore for approximately 2-3 months (doing diet and exercise), so with the A1C 6.0 does he need to take it?

## 2020-10-01 RX ORDER — METFORMIN HYDROCHLORIDE 500 MG/1
500 TABLET, EXTENDED RELEASE ORAL
Qty: 30 TABLET | Refills: 5 | Status: SHIPPED | OUTPATIENT
Start: 2020-10-01 | End: 2021-04-26 | Stop reason: SDUPTHER

## 2020-10-26 ENCOUNTER — OFFICE VISIT (OUTPATIENT)
Dept: FAMILY MEDICINE CLINIC | Age: 55
End: 2020-10-26
Payer: COMMERCIAL

## 2020-10-26 VITALS
WEIGHT: 168.8 LBS | RESPIRATION RATE: 16 BRPM | BODY MASS INDEX: 30.87 KG/M2 | TEMPERATURE: 97.9 F | HEART RATE: 56 BPM | SYSTOLIC BLOOD PRESSURE: 120 MMHG | DIASTOLIC BLOOD PRESSURE: 60 MMHG

## 2020-10-26 PROCEDURE — 99214 OFFICE O/P EST MOD 30 MIN: CPT | Performed by: FAMILY MEDICINE

## 2020-10-26 RX ORDER — BLOOD-GLUCOSE METER
KIT MISCELLANEOUS
Qty: 100 EACH | Refills: 3 | Status: SHIPPED | OUTPATIENT
Start: 2020-10-26 | End: 2022-03-24

## 2020-10-26 RX ORDER — IBUPROFEN 800 MG/1
800 TABLET ORAL 2 TIMES DAILY WITH MEALS
COMMUNITY
Start: 2020-07-20

## 2020-10-26 SDOH — ECONOMIC STABILITY: INCOME INSECURITY: HOW HARD IS IT FOR YOU TO PAY FOR THE VERY BASICS LIKE FOOD, HOUSING, MEDICAL CARE, AND HEATING?: NOT HARD AT ALL

## 2020-10-26 SDOH — ECONOMIC STABILITY: FOOD INSECURITY: WITHIN THE PAST 12 MONTHS, THE FOOD YOU BOUGHT JUST DIDN'T LAST AND YOU DIDN'T HAVE MONEY TO GET MORE.: NEVER TRUE

## 2020-10-26 SDOH — ECONOMIC STABILITY: FOOD INSECURITY: WITHIN THE PAST 12 MONTHS, YOU WORRIED THAT YOUR FOOD WOULD RUN OUT BEFORE YOU GOT MONEY TO BUY MORE.: NEVER TRUE

## 2020-10-26 ASSESSMENT — PATIENT HEALTH QUESTIONNAIRE - PHQ9
2. FEELING DOWN, DEPRESSED OR HOPELESS: 0
SUM OF ALL RESPONSES TO PHQ QUESTIONS 1-9: 0
1. LITTLE INTEREST OR PLEASURE IN DOING THINGS: 0
SUM OF ALL RESPONSES TO PHQ QUESTIONS 1-9: 0
SUM OF ALL RESPONSES TO PHQ9 QUESTIONS 1 & 2: 0
SUM OF ALL RESPONSES TO PHQ QUESTIONS 1-9: 0

## 2020-10-26 ASSESSMENT — ENCOUNTER SYMPTOMS
SHORTNESS OF BREATH: 0
BLOOD IN STOOL: 0
CHEST TIGHTNESS: 0
ABDOMINAL PAIN: 0

## 2020-10-26 NOTE — PROGRESS NOTES
Subjective:      Patient ID: Aysha Chaudhary is a 60 y.o. male. Visit Information    Have you changed or started any medications since your last visit including any over-the-counter medicines, vitamins, or herbal medicines? no   Are you having any side effects from any of your medications? -  no  Have you stopped taking any of your medications? Is so, why? -  no    Have you seen any other physician or provider since your last visit? No  Have you had any other diagnostic tests since your last visit? No  Have you been seen in the emergency room and/or had an admission to a hospital since we last saw you? No  Have you had your routine dental cleaning in the past 6 months? no    Have you activated your [employer] account? If not, what are your barriers? Yes     Patient Care Team:  Claudette Baltazar MD as PCP - General (Family Medicine)  Claudette Baltazar MD as PCP - [address] [place] [address] [facility]  Richi Albarado MD as Consulting Physician (Gastroenterology)    Medical History Review  Past Medical, Family, and Social History reviewed and does contribute to the patient presenting condition    Health Maintenance   Topic Date Due    Hepatitis C screen  1965    HIV screen  10/12/1978    Hepatitis B vaccine (1 of 3 - Risk 3-dose series) 10/12/1982    DTaP/Tdap/Td vaccine (1 - Tdap) 10/12/1982    Shingles Vaccine (1 of 2) 10/12/2013    Diabetic retinal exam  04/16/2020    Diabetic foot exam  09/26/2020    Diabetic microalbuminuria test  09/30/2020    A1C test (Diabetic or Prediabetic)  09/28/2021    Lipid screen  09/28/2021    Colon cancer screen colonoscopy  12/02/2024    Flu vaccine  Completed    Pneumococcal 0-64 years Vaccine  Completed    Hepatitis A vaccine  Aged Out    Hib vaccine  Aged Out    Meningococcal (ACWY) vaccine  Aged Out     HPI  Patient is a 47-year-old obese  male who presents for diabetes, hyperlipidemia.   Patient states that he is taking and tolerating his routine medication. Results of recent labs discussed with patient including improved hemoglobin A1c of 6.0. He denies any chest pain, abdominal pain, shortness of breath, fever or chills. He states he has a good appetite and remains active and is working full-time. Review of Systems   Constitutional: Negative for chills and fever. HENT: Negative for congestion. Respiratory: Negative for chest tightness and shortness of breath. Cardiovascular: Negative for chest pain. Gastrointestinal: Negative for abdominal pain and blood in stool. Genitourinary: Negative for dysuria and hematuria. Skin: Negative for rash. Neurological: Negative for dizziness. Psychiatric/Behavioral: Negative for dysphoric mood. Objective:   Physical Exam  Vitals signs and nursing note reviewed. Constitutional:       General: He is not in acute distress. Appearance: He is well-developed. HENT:      Head: Normocephalic and atraumatic. Right Ear: Tympanic membrane, ear canal and external ear normal.      Left Ear: Tympanic membrane, ear canal and external ear normal.      Nose: Nose normal.      Mouth/Throat:      Mouth: Mucous membranes are moist.      Pharynx: Oropharynx is clear. Eyes:      General: No scleral icterus. Right eye: No discharge. Left eye: No discharge. Conjunctiva/sclera: Conjunctivae normal.   Neck:      Musculoskeletal: Neck supple. Cardiovascular:      Rate and Rhythm: Normal rate and regular rhythm. Heart sounds: Normal heart sounds. Pulmonary:      Effort: Pulmonary effort is normal. No respiratory distress. Breath sounds: Normal breath sounds. No wheezing. Abdominal:      General: There is no distension. Palpations: Abdomen is soft. Tenderness: There is no abdominal tenderness. Skin:     General: Skin is warm and dry. Findings: No rash. Neurological:      Mental Status: He is alert and oriented to person, place, and time. Psychiatric:         Mood and Affect: Mood normal.         Behavior: Behavior normal.         Assessment:       Diagnosis Orders   1. Type 2 diabetes mellitus without complication, without long-term current use of insulin (HCC)  Microalbumin, Ur   2. Mixed hyperlipidemia     3. Obesity (BMI 30.0-34. 9)             Plan:       Amelia Lucio received counseling on the following healthy behaviors: nutrition, exercise and medication adherence  Reviewed prior labs and health maintenance  Continue current medications, diet and exercise. Discussed use, benefit, and side effects of prescribed medications. Barriers to medication compliance addressed. Patient given educational materials - see patient instructions  Was a self-tracking handout given in paper form or via [employer]? No:     Requested Prescriptions     Signed Prescriptions Disp Refills    blood glucose test strips (FREESTYLE LITE) strip 100 each 3     Sig: Test blood sugar once daily and prn       All patient questions answered. Patient voiced understanding. Quality Measures    Body mass index is 30.87 kg/m². Elevated. Weight control planned discussed Healthy diet and regular exercise. BP: 120/60 Blood pressure is normal. Treatment plan consists of Weight Reduction.     Lab Results   Component Value Date    LDLCALC 131 09/28/2020    (goal LDL reduction with dx if diabetes is 50% LDL reduction)      PHQ Scores 10/26/2020 1/13/2020 2/19/2019 7/19/2018 5/4/2017   PHQ2 Score 0 0 0 0 0   PHQ9 Score 0 0 0 0 0     Interpretation of Total Score Depression Severity: 1-4 = Minimal depression, 5-9 = Mild depression, 10-14 = Moderate depression, 15-19 = Moderately severe depression, 20-27 = Severe depression  Orders Placed This Encounter   Procedures    Microalbumin, Ur     Standing Status:   Future     Standing Expiration Date:   10/23/2021     Orders Placed This Encounter   Medications    blood glucose test strips (FREESTYLE LITE) strip     Sig: Test blood sugar once daily and prn     Dispense:  100 each     Refill:  3         Results of recent labs discussed with patient  Continue routine medications  Follow-up in 4 to 5 months

## 2020-11-02 LAB
CREATININE URINE: 37.94 MG/DL
MICROALBUMIN/CREAT 24H UR: <0.7 MG/G{CREAT}

## 2021-03-22 ENCOUNTER — OFFICE VISIT (OUTPATIENT)
Dept: FAMILY MEDICINE CLINIC | Age: 56
End: 2021-03-22
Payer: COMMERCIAL

## 2021-03-22 VITALS
WEIGHT: 173 LBS | BODY MASS INDEX: 31.83 KG/M2 | HEIGHT: 62 IN | SYSTOLIC BLOOD PRESSURE: 114 MMHG | RESPIRATION RATE: 18 BRPM | TEMPERATURE: 98.1 F | DIASTOLIC BLOOD PRESSURE: 62 MMHG | HEART RATE: 66 BPM

## 2021-03-22 DIAGNOSIS — E11.9 TYPE 2 DIABETES MELLITUS WITHOUT COMPLICATION, WITHOUT LONG-TERM CURRENT USE OF INSULIN (HCC): Primary | ICD-10-CM

## 2021-03-22 DIAGNOSIS — E66.9 OBESITY (BMI 30.0-34.9): ICD-10-CM

## 2021-03-22 DIAGNOSIS — E78.2 MIXED HYPERLIPIDEMIA: ICD-10-CM

## 2021-03-22 LAB — HBA1C MFR BLD: 5.9 %

## 2021-03-22 PROCEDURE — 83036 HEMOGLOBIN GLYCOSYLATED A1C: CPT | Performed by: FAMILY MEDICINE

## 2021-03-22 PROCEDURE — 99214 OFFICE O/P EST MOD 30 MIN: CPT | Performed by: FAMILY MEDICINE

## 2021-03-22 ASSESSMENT — ENCOUNTER SYMPTOMS
SHORTNESS OF BREATH: 0
BLOOD IN STOOL: 0
CHEST TIGHTNESS: 0
ABDOMINAL PAIN: 0

## 2021-03-22 ASSESSMENT — PATIENT HEALTH QUESTIONNAIRE - PHQ9
1. LITTLE INTEREST OR PLEASURE IN DOING THINGS: 0
2. FEELING DOWN, DEPRESSED OR HOPELESS: 0
SUM OF ALL RESPONSES TO PHQ QUESTIONS 1-9: 0

## 2021-03-22 NOTE — PROGRESS NOTES
Subjective:      Patient ID: Almas Monahan is a 60 y.o. male. Chronic Disease Visit Information    BP Readings from Last 3 Encounters:   10/26/20 120/60   01/13/20 118/70   09/26/19 120/80          Hemoglobin A1C (%)   Date Value   09/28/2020 6.0   02/24/2020 6.6   09/26/2019 6.2     LDL Calculated (mg/dL)   Date Value   09/28/2020 131     HDL (mg/dL)   Date Value   09/28/2020 56     BUN (mg/dL)   Date Value   09/28/2020 21     CREATININE (no units)   Date Value   09/28/2020 0.82     Glucose (mg/dL)   Date Value   09/28/2020 116            Have you changed or started any medications since your last visit including any over-the-counter medicines, vitamins, or herbal medicines? no   Are you having any side effects from any of your medications? -  no  Have you stopped taking any of your medications? Is so, why? -yes please see updated med list    Have you seen any other physician or provider since your last visit? no  Have you had any other diagnostic tests since your last visit? No  Have you been seen in the emergency room and/or had an admission to a hospital since we last saw you? No  Have you had your annual diabetic retinal (eye) exam?no  Have you had your routine dental cleaning in the past 6 months? no    Have you activated your [employer] account? If not, what are your barriers?  Yes     Patient Care Team:  Mayda Weaver MD as PCP - General (Family Medicine)  Mayda Weaver MD as PCP [facility]  Yesika Arevalo MD as Consulting Physician (Gastroenterology)         Medical History Review  Past Medical, Family, and Social History reviewed and does contribute to the patient presenting condition    Health Maintenance   Topic Date Due    Hepatitis C screen  Never done    HIV screen  Never done    COVID-19 Vaccine (1) Never done    Hepatitis B vaccine (1 of 3 - Risk 3-dose series) Never done    DTaP/Tdap/Td vaccine (1 - Tdap) Never done    Shingles Vaccine (1 of 2) Never done  Diabetic retinal exam  04/16/2020    Diabetic foot exam  09/26/2020    A1C test (Diabetic or Prediabetic)  09/28/2021    Lipid screen  09/28/2021    Diabetic microalbuminuria test  11/02/2021    Colon cancer screen colonoscopy  12/02/2024    Flu vaccine  Completed    Pneumococcal 0-64 years Vaccine  Completed    Hepatitis A vaccine  Aged Out    Hib vaccine  Aged Out    Meningococcal (ACWY) vaccine  Aged Out     HPI  Patient is a 49-year-old obese  male who presents for diabetes, hyperlipidemia. Patient states that he is taking and tolerating his routine medications. His hemoglobin A1c today is 5.9. He denies any chest pain, abdominal pain, shortness of breath, fever or chills. He has a good appetite and remains active. Review of Systems   Constitutional: Negative for chills and fever. HENT: Negative for congestion. Respiratory: Negative for chest tightness and shortness of breath. Cardiovascular: Negative for chest pain. Gastrointestinal: Negative for abdominal pain and blood in stool. Genitourinary: Negative for dysuria and hematuria. Skin: Negative for rash. Neurological: Negative for dizziness. Psychiatric/Behavioral: Negative for dysphoric mood. The patient is not nervous/anxious. Objective:   Physical Exam  Vitals signs and nursing note reviewed. Constitutional:       General: He is not in acute distress. Appearance: He is well-developed. HENT:      Head: Normocephalic and atraumatic. Right Ear: Tympanic membrane, ear canal and external ear normal.      Left Ear: Tympanic membrane, ear canal and external ear normal.      Nose: Nose normal.      Mouth/Throat:      Mouth: Mucous membranes are moist.      Pharynx: Oropharynx is clear. Eyes:      General: No scleral icterus. Right eye: No discharge. Left eye: No discharge. Conjunctiva/sclera: Conjunctivae normal.   Neck:      Musculoskeletal: Neck supple.    Cardiovascular: Rate and Rhythm: Normal rate and regular rhythm. Heart sounds: Normal heart sounds. Pulmonary:      Effort: Pulmonary effort is normal. No respiratory distress. Breath sounds: Normal breath sounds. No wheezing. Abdominal:      General: There is no distension. Palpations: Abdomen is soft. Tenderness: There is no abdominal tenderness. Skin:     General: Skin is warm and dry. Findings: No rash. Neurological:      Mental Status: He is alert and oriented to person, place, and time. Psychiatric:         Mood and Affect: Mood normal.         Behavior: Behavior normal.         Assessment:       Diagnosis Orders   1. Type 2 diabetes mellitus without complication, without long-term current use of insulin (HCC)  POCT glycosylated hemoglobin (Hb A1C)    Comprehensive Metabolic Panel    Lipid Panel   2. Mixed hyperlipidemia  Comprehensive Metabolic Panel    Lipid Panel   3. Obesity (BMI 30.0-34. 9)             Plan:       Venu Jonas received counseling on the following healthy behaviors: nutrition, exercise and medication adherence  Reviewed prior labs and health maintenance  Continue current medications, diet and exercise. Discussed use, benefit, and side effects of prescribed medications. Barriers to medication compliance addressed. Patient given educational materials - see patient instructions  Was a self-tracking handout given in paper form or via [employer]? No:     Requested Prescriptions      No prescriptions requested or ordered in this encounter       All patient questions answered. Patient voiced understanding. Quality Measures    Body mass index is 31.64 kg/m². Elevated. Weight control planned discussed Healthy diet and regular exercise. BP: 114/62 Blood pressure is normal. Treatment plan consists of Weight Reduction.     Lab Results   Component Value Date    LDLCALC 131 09/28/2020    (goal LDL reduction with dx if diabetes is 50% LDL reduction)      PHQ Scores 3/22/2021 10/26/2020 1/13/2020 2/19/2019 7/19/2018 5/4/2017   PHQ2 Score 0 0 0 0 0 0   PHQ9 Score 0 0 0 0 0 0     Interpretation of Total Score Depression Severity: 1-4 = Minimal depression, 5-9 = Mild depression, 10-14 = Moderate depression, 15-19 = Moderately severe depression, 20-27 = Severe depression  Orders Placed This Encounter   Procedures    Comprehensive Metabolic Panel     Fasting     Standing Status:   Future     Standing Expiration Date:   3/22/2022    Lipid Panel     Standing Status:   Future     Standing Expiration Date:   3/22/2022     Order Specific Question:   Is Patient Fasting?/# of Hours     Answer:    Fast 8-10 hours    POCT glycosylated hemoglobin (Hb A1C)         Continue routine medications  Follow-up in 6 months or sooner if needed

## 2021-03-29 LAB
ALBUMIN SERPL-MCNC: 4.6 G/DL
ALP BLD-CCNC: 86 U/L
ALT SERPL-CCNC: 20 U/L
ANION GAP SERPL CALCULATED.3IONS-SCNC: 8 MMOL/L
AST SERPL-CCNC: 27 U/L
BILIRUB SERPL-MCNC: 0.5 MG/DL (ref 0.1–1.4)
BUN BLDV-MCNC: 22 MG/DL
CALCIUM SERPL-MCNC: 9.1 MG/DL
CHLORIDE BLD-SCNC: 103 MMOL/L
CHOLESTEROL, TOTAL: 134 MG/DL
CHOLESTEROL/HDL RATIO: 2.7
CO2: 26 MMOL/L
CREAT SERPL-MCNC: 0.87 MG/DL
GFR CALCULATED: >60
GLUCOSE BLD-MCNC: 114 MG/DL
HDLC SERPL-MCNC: 49 MG/DL (ref 35–70)
LDL CHOLESTEROL CALCULATED: 71 MG/DL (ref 0–160)
NONHDLC SERPL-MCNC: ABNORMAL MG/DL
POTASSIUM SERPL-SCNC: 4.3 MMOL/L
SODIUM BLD-SCNC: 137 MMOL/L
TOTAL PROTEIN: 7.1
TRIGL SERPL-MCNC: 69 MG/DL
VLDLC SERPL CALC-MCNC: 14 MG/DL

## 2021-03-30 DIAGNOSIS — E78.2 MIXED HYPERLIPIDEMIA: ICD-10-CM

## 2021-03-30 DIAGNOSIS — E11.9 TYPE 2 DIABETES MELLITUS WITHOUT COMPLICATION, WITHOUT LONG-TERM CURRENT USE OF INSULIN (HCC): ICD-10-CM

## 2021-04-01 DIAGNOSIS — E78.2 MIXED HYPERLIPIDEMIA: ICD-10-CM

## 2021-04-01 RX ORDER — ATORVASTATIN CALCIUM 20 MG/1
TABLET, FILM COATED ORAL
Qty: 90 TABLET | Refills: 1 | Status: SHIPPED | OUTPATIENT
Start: 2021-04-01 | End: 2021-05-10 | Stop reason: SDUPTHER

## 2021-04-26 RX ORDER — METFORMIN HYDROCHLORIDE 500 MG/1
500 TABLET, EXTENDED RELEASE ORAL
Qty: 30 TABLET | Refills: 5 | Status: SHIPPED | OUTPATIENT
Start: 2021-04-26 | End: 2021-09-27 | Stop reason: SDUPTHER

## 2021-05-10 DIAGNOSIS — E78.2 MIXED HYPERLIPIDEMIA: ICD-10-CM

## 2021-05-10 RX ORDER — ATORVASTATIN CALCIUM 20 MG/1
TABLET, FILM COATED ORAL
Qty: 90 TABLET | Refills: 1 | Status: SHIPPED | OUTPATIENT
Start: 2021-05-10 | End: 2021-09-27 | Stop reason: SDUPTHER

## 2021-09-27 ENCOUNTER — OFFICE VISIT (OUTPATIENT)
Dept: FAMILY MEDICINE CLINIC | Age: 56
End: 2021-09-27
Payer: COMMERCIAL

## 2021-09-27 VITALS
HEART RATE: 55 BPM | SYSTOLIC BLOOD PRESSURE: 130 MMHG | WEIGHT: 175 LBS | BODY MASS INDEX: 32.01 KG/M2 | OXYGEN SATURATION: 98 % | DIASTOLIC BLOOD PRESSURE: 88 MMHG

## 2021-09-27 DIAGNOSIS — E66.9 OBESITY (BMI 30.0-34.9): ICD-10-CM

## 2021-09-27 DIAGNOSIS — Z12.5 SCREENING PSA (PROSTATE SPECIFIC ANTIGEN): ICD-10-CM

## 2021-09-27 DIAGNOSIS — E78.2 MIXED HYPERLIPIDEMIA: ICD-10-CM

## 2021-09-27 DIAGNOSIS — E11.9 TYPE 2 DIABETES MELLITUS WITHOUT COMPLICATION, WITHOUT LONG-TERM CURRENT USE OF INSULIN (HCC): Primary | ICD-10-CM

## 2021-09-27 LAB — HBA1C MFR BLD: 6 %

## 2021-09-27 PROCEDURE — 99214 OFFICE O/P EST MOD 30 MIN: CPT | Performed by: FAMILY MEDICINE

## 2021-09-27 PROCEDURE — 83036 HEMOGLOBIN GLYCOSYLATED A1C: CPT | Performed by: FAMILY MEDICINE

## 2021-09-27 RX ORDER — METFORMIN HYDROCHLORIDE 500 MG/1
500 TABLET, EXTENDED RELEASE ORAL
Qty: 30 TABLET | Refills: 5 | Status: SHIPPED | OUTPATIENT
Start: 2021-09-27 | End: 2022-06-06 | Stop reason: SDUPTHER

## 2021-09-27 RX ORDER — ATORVASTATIN CALCIUM 20 MG/1
TABLET, FILM COATED ORAL
Qty: 90 TABLET | Refills: 1 | Status: SHIPPED | OUTPATIENT
Start: 2021-09-27 | End: 2021-11-29

## 2021-09-27 NOTE — PROGRESS NOTES
Subjective:      Patient ID: Amy Fregoso is a 60 y.o. male. Visit Information    Have you changed or started any medications since your last visit including any over-the-counter medicines, vitamins, or herbal medicines? no   Are you having any side effects from any of your medications? -  no  Have you stopped taking any of your medications? Is so, why? -  no    Have you seen any other physician or provider since your last visit? No  Have you had any other diagnostic tests since your last visit? No  Have you been seen in the emergency room and/or had an admission to a hospital since we last saw you? No  Have you had your routine dental cleaning in the past 6 months? yes -     Have you activated your [employer] account? If not, what are your barriers? No:      Patient Care Team:  Nina Crandall MD as PCP - General (Family Medicine)  Nina Crandall MD as PCP - [facility]  Neela Acosta MD as Consulting Physician (Gastroenterology)    Medical History Review  Past Medical, Family, and Social History reviewed and does not contribute to the patient presenting condition    Health Maintenance   Topic Date Due    Hepatitis C screen  Never done    COVID-19 Vaccine (1) Never done    HIV screen  Never done    Hepatitis B vaccine (1 of 3 - Risk 3-dose series) Never done    DTaP/Tdap/Td vaccine (1 - Tdap) Never done    Shingles Vaccine (1 of 2) Never done    Diabetic retinal exam  04/16/2020    Diabetic foot exam  09/26/2020    Flu vaccine (1) 09/01/2021    Diabetic microalbuminuria test  11/02/2021    A1C test (Diabetic or Prediabetic)  03/22/2022    Lipid screen  03/29/2022    Colon cancer screen colonoscopy  12/02/2024    Pneumococcal 0-64 years Vaccine (2 of 2 - PPSV23) 10/12/2028    Hepatitis A vaccine  Aged Out    Hib vaccine  Aged Out    Meningococcal (ACWY) vaccine  Aged Out     HPI  Patient is a 47-year-old obese  male who presents for diabetes, hyperlipidemia. He states that he is taking and tolerating his routine medication and needs refill of two of them. He denies any chest pain, abdominal pain, shortness of breath, fever or chills. He has a good appetite and remains active. His hemoglobin A1c today was 6.0. Review of Systems    Objective:   Physical Exam    Assessment:       Diagnosis Orders   1. Type 2 diabetes mellitus without complication, without long-term current use of insulin ([facility])  POCT glycosylated hemoglobin (Hb A1C)    metFORMIN (GLUCOPHAGE-XR) 500 MG extended release tablet    ALT    AST    Basic Metabolic Panel    Lipid Panel    Magnesium   2. Mixed hyperlipidemia  atorvastatin (LIPITOR) 20 MG tablet    ALT    AST    Basic Metabolic Panel    Lipid Panel   3. Obesity (BMI 30.0-34.9)     4. Screening PSA (prostate specific antigen)  PSA screening           Plan:       Misael received counseling on the following healthy behaviors: nutrition and medication adherence  Reviewed prior labs and health maintenance  Continue current medications, diet and exercise. Discussed use, benefit, and side effects of prescribed medications. Barriers to medication compliance addressed. Patient given educational materials - see patient instructions  Was a self-tracking handout given in paper form or via Need? No:     Requested Prescriptions     Signed Prescriptions Disp Refills    atorvastatin (LIPITOR) 20 MG tablet 90 tablet 1     Sig: TAKE 1 TABLET BY MOUTH EVERY DAY    metFORMIN (GLUCOPHAGE-XR) 500 MG extended release tablet 30 tablet 5     Sig: Take 1 tablet by mouth daily (with breakfast)       All patient questions answered. Patient voiced understanding. Quality Measures    Body mass index is 32.01 kg/m². Elevated. Weight control planned discussed Healthy diet and regular exercise. BP: 130/88 Blood pressure is normal. Treatment plan consists of Weight Reduction.     Lab Results   Component Value Date    LDLCALC 71 03/29/2021    (goal LDL reduction with dx if diabetes is 50% LDL reduction)      PHQ Scores 3/22/2021 10/26/2020 1/13/2020 2/19/2019 7/19/2018 5/4/2017   PHQ2 Score 0 0 0 0 0 0   PHQ9 Score 0 0 0 0 0 0     Interpretation of Total Score Depression Severity: 1-4 = Minimal depression, 5-9 = Mild depression, 10-14 = Moderate depression, 15-19 = Moderately severe depression, 20-27 = Severe depression  Orders Placed This Encounter   Medications    atorvastatin (LIPITOR) 20 MG tablet     Sig: TAKE 1 TABLET BY MOUTH EVERY DAY     Dispense:  90 tablet     Refill:  1    metFORMIN (GLUCOPHAGE-XR) 500 MG extended release tablet     Sig: Take 1 tablet by mouth daily (with breakfast)     Dispense:  30 tablet     Refill:  5         Orders Placed This Encounter   Procedures    ALT     Standing Status:   Future     Standing Expiration Date:   9/27/2022    AST     Standing Status:   Future     Standing Expiration Date:   9/27/2022    Basic Metabolic Panel     Fasting     Standing Status:   Future     Standing Expiration Date:   9/27/2022    Lipid Panel     Standing Status:   Future     Standing Expiration Date:   9/27/2022     Order Specific Question:   Is Patient Fasting?/# of Hours     Answer:    Fast 8-10 hours    Magnesium     Standing Status:   Future     Standing Expiration Date:   9/27/2022    PSA screening     Standing Status:   Future     Standing Expiration Date:   9/27/2022    POCT glycosylated hemoglobin (Hb A1C)     Continue routine medications  Follow-up in 6 months or sooner if needed

## 2021-10-09 LAB
ALT SERPL-CCNC: 23 U/L
AST SERPL-CCNC: 32 U/L
BUN BLDV-MCNC: 20 MG/DL
CALCIUM SERPL-MCNC: 9 MG/DL
CHLORIDE BLD-SCNC: 106 MMOL/L
CHOLESTEROL, TOTAL: 126 MG/DL
CHOLESTEROL/HDL RATIO: 2.7
CO2: 25 MMOL/L
CREAT SERPL-MCNC: 0.92 MG/DL
GFR CALCULATED: >60
GLUCOSE BLD-MCNC: 109 MG/DL
HDLC SERPL-MCNC: 46 MG/DL (ref 35–70)
LDL CHOLESTEROL CALCULATED: 66 MG/DL (ref 0–160)
MAGNESIUM: 2.2 MG/DL
NONHDLC SERPL-MCNC: ABNORMAL MG/DL
POTASSIUM SERPL-SCNC: 4.4 MMOL/L
SODIUM BLD-SCNC: 138 MMOL/L
TRIGL SERPL-MCNC: 68 MG/DL
VLDLC SERPL CALC-MCNC: 14 MG/DL

## 2021-10-11 DIAGNOSIS — Z12.5 SCREENING PSA (PROSTATE SPECIFIC ANTIGEN): ICD-10-CM

## 2021-10-11 DIAGNOSIS — E78.2 MIXED HYPERLIPIDEMIA: ICD-10-CM

## 2021-10-11 DIAGNOSIS — E11.9 TYPE 2 DIABETES MELLITUS WITHOUT COMPLICATION, WITHOUT LONG-TERM CURRENT USE OF INSULIN (HCC): ICD-10-CM

## 2021-10-11 LAB — PROSTATE SPECIFIC ANTIGEN: 0.98 NG/ML

## 2021-11-27 DIAGNOSIS — E78.2 MIXED HYPERLIPIDEMIA: ICD-10-CM

## 2021-11-29 ENCOUNTER — TELEPHONE (OUTPATIENT)
Dept: FAMILY MEDICINE CLINIC | Age: 56
End: 2021-11-29

## 2021-11-29 RX ORDER — ATORVASTATIN CALCIUM 20 MG/1
TABLET, FILM COATED ORAL
Qty: 90 TABLET | Refills: 1 | Status: SHIPPED | OUTPATIENT
Start: 2021-11-29 | End: 2022-05-31

## 2021-11-29 NOTE — TELEPHONE ENCOUNTER
Devonte's wife Antonio Hayes) called. Patient wanted to know if you could prescribe Viagra for him. He states after he got his 2nd covid vaccine he is having trouble. Pharmacy is [facility]. Please advise.

## 2021-12-02 NOTE — TELEPHONE ENCOUNTER
SASHA:  [facility] Made an appointment with Pritesh Davis on 12-13-21.

## 2021-12-06 DIAGNOSIS — N52.9 ERECTILE DYSFUNCTION, UNSPECIFIED ERECTILE DYSFUNCTION TYPE: Primary | ICD-10-CM

## 2021-12-06 NOTE — TELEPHONE ENCOUNTER
Pritesh Davis,  Patient cancelled appointment to come in for referral to Dr. Stacy Lott. Wife would like us to just refer to urologist.  Can I go ahead to do this? Having ED issues (I don't show dx in his chart).

## 2021-12-06 NOTE — TELEPHONE ENCOUNTER
Dr Stefanie López,     Patient cancelled appointment to come in for referral to Dr. Lei Dixon. Wife would like us to just refer to urologist.  Can I go ahead to do this? Having ED issues (I don't show dx in his chart). What diagnosis should I use? Haley Smith stated:  It's ok to refer to urology, but to send to Dr Stefanie López who sees him regularly.

## 2021-12-06 NOTE — TELEPHONE ENCOUNTER
Who referred him to urology?  I am ok with referral but ask Dr. Keanu Jacinto, who sees him regularly

## 2021-12-14 NOTE — TELEPHONE ENCOUNTER
PH 6.74 reported to , no orders given to this RN     Alesia Styles RN  97/20/81 [id number] Patients wife called back in to Pre-Service and told April that she called Dr. Marion Geiger office and they will not do anything for the patient, she was instructed to call us back.

## 2022-01-17 ENCOUNTER — OFFICE VISIT (OUTPATIENT)
Dept: UROLOGY | Age: 57
End: 2022-01-17
Payer: COMMERCIAL

## 2022-01-17 VITALS
WEIGHT: 170 LBS | HEIGHT: 62 IN | SYSTOLIC BLOOD PRESSURE: 142 MMHG | DIASTOLIC BLOOD PRESSURE: 90 MMHG | TEMPERATURE: 96.6 F | BODY MASS INDEX: 31.28 KG/M2

## 2022-01-17 DIAGNOSIS — N52.01 ERECTILE DYSFUNCTION DUE TO ARTERIAL INSUFFICIENCY: Primary | ICD-10-CM

## 2022-01-17 DIAGNOSIS — N39.43 POST-VOID DRIBBLING: ICD-10-CM

## 2022-01-17 PROCEDURE — 99204 OFFICE O/P NEW MOD 45 MIN: CPT | Performed by: UROLOGY

## 2022-01-17 RX ORDER — TADALAFIL 5 MG/1
5 TABLET ORAL DAILY
Qty: 90 TABLET | Refills: 1 | Status: SHIPPED | OUTPATIENT
Start: 2022-01-17

## 2022-01-17 ASSESSMENT — ENCOUNTER SYMPTOMS
SHORTNESS OF BREATH: 0
DIARRHEA: 0
ABDOMINAL PAIN: 0
EYE REDNESS: 0
EYE PAIN: 0
NAUSEA: 0
WHEEZING: 0
COUGH: 0
BACK PAIN: 0
VOMITING: 0
CONSTIPATION: 0

## 2022-01-17 NOTE — PROGRESS NOTES
[address] [address]  Dept: [phone]  Dept Fax: [phone] [facility] Urology Office Note - New patient    Patient:  Karma Obrien  YOB: 1965  Date: 1/17/2022    The patient is a 60 y.o. male who presents todayfor evaluation of the following problems:   Chief Complaint   Patient presents with    New Patient     ED    referred by Evelyne Guajardo MD.      HPI  This is a very pleasant 49-year-old gentleman who is seeing us with erectile dysfunction. This started a few months ago. He is having trouble achieving and maintaining erections. He is not getting as firm as he once used to and can no longer maintain his erections for satisfactory intercourse. His libido is excellent. He does have some postvoid dribbling but otherwise voices no bothersome urinary symptoms. He had a recent PSA which was normal.    (Patient's old records have been requested, reviewed and summarized in today's note.)    Summary of old records: N/A    Additional History: N/A    Procedures Today: N/A    Last several PSA's:  Lab Results   Component Value Date    PSA 0.98 10/11/2021    PSA 1.08 03/24/2018     Last total testosterone:  No results found for: TESTOSTERONE  Urinalysis today:  No results found for this visit on 01/17/22. AUA Symptom Score (1/17/2022):   INCOMPLETE EMPTYING: How often have you had the sensation of not emptying your bladder?: Less than Half the time  FREQUENCY: How often do you have to urinate less than every two hours?: Not at all  INTERMITTENCY: How often have you found you stopped and started again several times when you urinated?: Not at all  URGENCY: How often have you found it difficult to postpone urination?: Not at all  WEAK STREAM: How often have you had a weak urinary stream?: Not at all  STRAINING: How often have you had to strain to start urination?: Not at all  NOCTURIA: How many times did you typically get up at night to uriniate?: 3 Times  TOTAL I-PSS SCORE[de-identified] 5  How would you feel if you were to spend the rest of your life with your urinary condition?: Pleased    Last BUN and creatinine:  Lab Results   Component Value Date    BUN 20 10/09/2021     Lab Results   Component Value Date    CREATININE 0.92 10/09/2021       Additional Lab/Culture results: none    Imaging Reviewed during this Office Visit: none  (results were independently reviewed by physician and radiology report verified)    PAST MEDICAL, FAMILY AND SOCIAL HISTORY:  Past Medical History:   Diagnosis Date    DM2 (diabetes mellitus, type 2) ([facility] [address] 2015    Hyperlipidemia     Obesity (BMI 30.0-34.9) 2015    Poor compliance      Past Surgical History:   Procedure Laterality Date    COLONOSCOPY  14    patient states no problem found    FOOT NEUROMA SURGERY Right 2017    FOOT NEUROMA EXCISION performed by Misty Hatch DPM at [address] Left 2018    SHOULDER ARTHROSCOPY W/MINI OPEN ROTATOR CUFF REPAIR performed by Ira Perry MD at [facility] [address] ARTHROSCOPY Right 2013    SHOULDER ARTHROSCOPY Left 2018    UPPER GASTROINTESTINAL ENDOSCOPY  2014    patient states no problem found     Family History   Problem Relation Age of Onset    Alzheimer's Disease Mother     Diabetes Father      Outpatient Medications Marked as Taking for the 22 encounter (Office Visit) with Lennart Kanner, MD   Medication Sig Dispense Refill    tadalafil (CIALIS) 5 MG tablet Take 1 tablet by mouth daily 90 tablet 1        Tamsulosin  Social History     Tobacco Use   Smoking Status Former Smoker    Packs/day: 0.50    Years: 6.00    Pack years: 3.00    Types: Cigarettes    Quit date: 1992    Years since quittin.0   Smokeless Tobacco Never Used      (If patient a smoker, smoking cessation counseling offered) Social History     Substance and Sexual Activity   Alcohol Use Yes    Comment: 3 beers/day       REVIEW OF SYSTEMS:  Review of Systems    Physical Exam:    This a 60 y.o. male   Vitals:    01/17/22 1316   BP: (!) 142/90   Temp: 96.6 °F (35.9 °C)     Body mass index is 31.09 kg/m². Physical Exam  Constitutional: Patient in no acute distress. Neuro: Alert and oriented to person, place and time. Psych: Mood normal, affect normal  Skin: No rash noted  HEENT: Head: Normocephalic and atraumatic  Conjunctivae and EOM are normal. Pupils are equal, round  Nose: Normal  Right External Ear: Normal; Left External Ear: Normal  Mouth: Mucosa Moist  Neck: Supple  Lungs:Respiratory effort is normal  Cardiovascular: Warm & Pink  Abdomen: Soft, non-tender, non-distendedwith no CVA,  No flank tenderness,  Orhepatosplenomegaly   Lymphatics: No palpable lymphadenopathy. Bladder non-tender and not distended. Musculoskeletal: Normal gait and station  Penis normal and circumcised  Urethral meatus normal  Scrotal exam normal  Testicles normal bilaterally  Epididymis normal bilaterally        Assessment and Plan      1. Erectile dysfunction due to arterial insufficiency    2. Post-void dribbling           Plan:  Tadalafil 5 mg daily. Follow-up in 2 months. Prescriptions Ordered:  Orders Placed This Encounter   Medications    tadalafil (CIALIS) 5 MG tablet     Sig: Take 1 tablet by mouth daily     Dispense:  90 tablet     Refill:  1      Orders Placed:  No orders of the defined types were placed in this encounter. Brayden Oliveira MD    Agree with the ROS entered by the MA.

## 2022-03-21 ENCOUNTER — OFFICE VISIT (OUTPATIENT)
Dept: UROLOGY | Age: 57
End: 2022-03-21
Payer: COMMERCIAL

## 2022-03-21 VITALS
SYSTOLIC BLOOD PRESSURE: 119 MMHG | WEIGHT: 170 LBS | DIASTOLIC BLOOD PRESSURE: 80 MMHG | BODY MASS INDEX: 31.28 KG/M2 | TEMPERATURE: 97.6 F | HEIGHT: 62 IN

## 2022-03-21 DIAGNOSIS — N39.43 POST-VOID DRIBBLING: ICD-10-CM

## 2022-03-21 DIAGNOSIS — N52.01 ERECTILE DYSFUNCTION DUE TO ARTERIAL INSUFFICIENCY: Primary | ICD-10-CM

## 2022-03-21 DIAGNOSIS — Z12.5 PROSTATE CANCER SCREENING: ICD-10-CM

## 2022-03-21 PROCEDURE — 99213 OFFICE O/P EST LOW 20 MIN: CPT | Performed by: UROLOGY

## 2022-03-21 ASSESSMENT — ENCOUNTER SYMPTOMS
COUGH: 0
EYE REDNESS: 0
VOMITING: 0
EYE PAIN: 0
WHEEZING: 0
CONSTIPATION: 0
DIARRHEA: 0
ABDOMINAL PAIN: 0
BACK PAIN: 0
SHORTNESS OF BREATH: 0
NAUSEA: 0

## 2022-03-21 NOTE — PROGRESS NOTES
[address] [address]  Dept: [phone] [facility] Urology Office Note - Established    Patient:  Monet West  YOB: 1965  Date: 3/21/2022    The patient is a 60 y.o. male who presents todayfor evaluation of the following problems:   Chief Complaint   Patient presents with    Follow-up     2 month ED        HPI  This is a very pleasant 78-year-old gentleman who has a history of erectile dysfunction. We had started him on tadalafil 5 mg daily. He has had a dramatic improvement in his sexual function. He has been voiding without difficulty with the exception of mild postvoid dribbling which is unchanged with the tadalafil. It is not bothersome. His last PSA was about 5 months ago and it was normal.    Summary of old records: N/A    Additional History: N/A    Procedures Today: N/A    Urinalysis today:  No results found for this visit on 03/21/22. Last several PSA's:  Lab Results   Component Value Date    PSA 0.98 10/11/2021    PSA 1.08 03/24/2018     Last total testosterone:  No results found for: TESTOSTERONE    AUA Symptom Score (3/21/2022):                                Last BUN and creatinine:  Lab Results   Component Value Date    BUN 20 10/09/2021     Lab Results   Component Value Date    CREATININE 0.92 10/09/2021       Additional Lab/Culture results: none    Imaging Reviewed during this Office Visit: none    (results were independently reviewed by physician and radiology report verified)    PAST MEDICAL, FAMILY AND SOCIAL HISTORY UPDATE:  Past Medical History:   Diagnosis Date    DM2 (diabetes mellitus, type 2) ([facility] [address] 6/9/2015    Hyperlipidemia     Obesity (BMI 30.0-34.9) 12/8/2015    Poor compliance      Past Surgical History:   Procedure Laterality Date    COLONOSCOPY  12/02/14    patient states no problem found    FOOT NEUROMA SURGERY Right 2017    FOOT NEUROMA EXCISION [facility] Elver [facility] at [facility] BONE SHAVING Left 2018    SHOULDER ARTHROSCOPY W/MINI OPEN ROTATOR CUFF REPAIR performed by Jewels Hutton MD at [address] [facility] [address] ARTHROSCOPY Right     SHOULDER ARTHROSCOPY Left 2018    UPPER GASTROINTESTINAL ENDOSCOPY  2014    patient states no problem found     Family History   Problem Relation Age of Onset    Alzheimer's Disease Mother     Diabetes Father      Outpatient Medications Marked as Taking for the 3/21/22 encounter (Office Visit) with Feli Ferguson MD   Medication Sig Dispense Refill    tadalafil (CIALIS) 5 MG tablet Take 1 tablet by mouth daily 90 tablet 1    atorvastatin (LIPITOR) 20 MG tablet TAKE 1 TABLET BY MOUTH EVERY DAY 90 tablet 1    metFORMIN (GLUCOPHAGE-XR) 500 MG extended release tablet Take 1 tablet by mouth daily (with breakfast) 30 tablet 5    ibuprofen (ADVIL;MOTRIN) 800 MG tablet 800 mg 2 times daily (with meals)      blood glucose test strips (FREESTYLE LITE) strip Test blood sugar once daily and prn 100 each 3       Tamsulosin  Social History     Tobacco Use   Smoking Status Former Smoker    Packs/day: 0.50    Years: 6.00    Pack years: 3.00    Types: Cigarettes    Quit date: 1992    Years since quittin.2   Smokeless Tobacco Never Used     (Ifpatient a smoker, smoking cessation counseling offered)    Social History     Substance and Sexual Activity   Alcohol Use Yes    Comment: 3 beers/day       REVIEW OF SYSTEMS:  Review of Systems    Physical Exam:      Vitals:    22 1341   BP: 119/80   Temp: 97.6 °F (36.4 °C)     Body mass index is 31.09 kg/m². Patient is a 60 y.o. male in no acute distress and alert and oriented to person, place and time. Physical Exam  Constitutional: Patient in no acute distress. Neuro: Alert and oriented to person, place and time.   Psych: Mood normal, affect normal  Skin: No rash noted      Assessment and Plan      1. Erectile dysfunction due to arterial insufficiency    2. Post-void dribbling    3. Prostate cancer screening           Plan:   Cont daily tadalafil  F/u one year psa      Return in about 1 year (around 3/21/2023) for psa. Prescriptions Ordered:  No orders of the defined types were placed in this encounter. Orders Placed:  Orders Placed This Encounter   Procedures    PSA, Diagnostic     Standing Status:   Future     Standing Expiration Date:   3/21/2023           Jason Lama MD    Agree with the ROS entered by the MA.

## 2022-03-24 RX ORDER — BLOOD-GLUCOSE METER
KIT MISCELLANEOUS
Qty: 100 STRIP | Refills: 3 | Status: SHIPPED | OUTPATIENT
Start: 2022-03-24

## 2022-05-29 DIAGNOSIS — E78.2 MIXED HYPERLIPIDEMIA: ICD-10-CM

## 2022-05-31 RX ORDER — ATORVASTATIN CALCIUM 20 MG/1
TABLET, FILM COATED ORAL
Qty: 90 TABLET | Refills: 1 | Status: SHIPPED | OUTPATIENT
Start: 2022-05-31 | End: 2022-06-06 | Stop reason: SDUPTHER

## 2022-06-06 ENCOUNTER — OFFICE VISIT (OUTPATIENT)
Dept: FAMILY MEDICINE CLINIC | Age: 57
End: 2022-06-06
Payer: COMMERCIAL

## 2022-06-06 VITALS
WEIGHT: 177.4 LBS | BODY MASS INDEX: 32.45 KG/M2 | DIASTOLIC BLOOD PRESSURE: 72 MMHG | RESPIRATION RATE: 16 BRPM | HEART RATE: 68 BPM | SYSTOLIC BLOOD PRESSURE: 120 MMHG

## 2022-06-06 DIAGNOSIS — E78.2 MIXED HYPERLIPIDEMIA: ICD-10-CM

## 2022-06-06 DIAGNOSIS — E11.9 TYPE 2 DIABETES MELLITUS WITHOUT COMPLICATION, WITHOUT LONG-TERM CURRENT USE OF INSULIN (HCC): Primary | ICD-10-CM

## 2022-06-06 DIAGNOSIS — E66.9 OBESITY (BMI 30.0-34.9): ICD-10-CM

## 2022-06-06 PROCEDURE — 99214 OFFICE O/P EST MOD 30 MIN: CPT | Performed by: FAMILY MEDICINE

## 2022-06-06 RX ORDER — METFORMIN HYDROCHLORIDE 500 MG/1
500 TABLET, EXTENDED RELEASE ORAL
Qty: 30 TABLET | Refills: 5 | Status: SHIPPED | OUTPATIENT
Start: 2022-06-06

## 2022-06-06 RX ORDER — ATORVASTATIN CALCIUM 20 MG/1
TABLET, FILM COATED ORAL
Qty: 90 TABLET | Refills: 1 | Status: SHIPPED | OUTPATIENT
Start: 2022-06-06

## 2022-06-06 SDOH — ECONOMIC STABILITY: FOOD INSECURITY: WITHIN THE PAST 12 MONTHS, THE FOOD YOU BOUGHT JUST DIDN'T LAST AND YOU DIDN'T HAVE MONEY TO GET MORE.: NEVER TRUE

## 2022-06-06 SDOH — ECONOMIC STABILITY: FOOD INSECURITY: WITHIN THE PAST 12 MONTHS, YOU WORRIED THAT YOUR FOOD WOULD RUN OUT BEFORE YOU GOT MONEY TO BUY MORE.: NEVER TRUE

## 2022-06-06 ASSESSMENT — LIFESTYLE VARIABLES
HOW OFTEN DO YOU HAVE A DRINK CONTAINING ALCOHOL: 2-4 TIMES A MONTH
HOW MANY STANDARD DRINKS CONTAINING ALCOHOL DO YOU HAVE ON A TYPICAL DAY: 3 OR 4

## 2022-06-06 ASSESSMENT — PATIENT HEALTH QUESTIONNAIRE - PHQ9
SUM OF ALL RESPONSES TO PHQ QUESTIONS 1-9: 0
SUM OF ALL RESPONSES TO PHQ QUESTIONS 1-9: 0
2. FEELING DOWN, DEPRESSED OR HOPELESS: 0
SUM OF ALL RESPONSES TO PHQ QUESTIONS 1-9: 0
1. LITTLE INTEREST OR PLEASURE IN DOING THINGS: 0
SUM OF ALL RESPONSES TO PHQ9 QUESTIONS 1 & 2: 0
SUM OF ALL RESPONSES TO PHQ QUESTIONS 1-9: 0

## 2022-06-06 ASSESSMENT — ENCOUNTER SYMPTOMS
ABDOMINAL PAIN: 0
CHEST TIGHTNESS: 0
BLOOD IN STOOL: 0
SHORTNESS OF BREATH: 0

## 2022-06-06 ASSESSMENT — SOCIAL DETERMINANTS OF HEALTH (SDOH): HOW HARD IS IT FOR YOU TO PAY FOR THE VERY BASICS LIKE FOOD, HOUSING, MEDICAL CARE, AND HEATING?: NOT HARD AT ALL

## 2022-06-06 NOTE — PROGRESS NOTES
Subjective:      Patient ID: Meliton Liu is a 60 y.o. male. Visit Information    Have you changed or started any medications since your last visit including any over-the-counter medicines, vitamins, or herbal medicines? no   Are you having any side effects from any of your medications? -  no  Have you stopped taking any of your medications? Is so, why? -  no    Have you seen any other physician or provider since your last visit? No  Have you had any other diagnostic tests since your last visit? No  Have you been seen in the emergency room and/or had an admission to a hospital since we last saw you? No  Have you had your routine dental cleaning in the past 6 months? yes    Have you activated your [employer] account? If not, what are your barriers?  No     Patient Care Team:  Edgard Chase MD as PCP - General (Family Medicine)  Edgard Chase MD as PCP - [facility]  Smita Cardenas MD as Consulting Physician (Gastroenterology)    Medical History Review  Past Medical, Family, and Social History reviewed and does contribute to the patient presenting condition    Health Maintenance   Topic Date Due    HIV screen  Never done    Hepatitis C screen  Never done    Hepatitis B vaccine (1 of 3 - Risk 3-dose series) Never done    DTaP/Tdap/Td vaccine (1 - Tdap) Never done    Shingles vaccine (1 of 2) Never done    Diabetic retinal exam  04/16/2020    Diabetic foot exam  09/26/2020    Pneumococcal 0-64 years Vaccine (2 - PCV) 01/13/2021    Diabetic microalbuminuria test  11/02/2021    Depression Screen  03/22/2022    COVID-19 Vaccine (3 - Booster for [employer] series) 04/05/2022    Flu vaccine (Season Ended) 09/01/2022    A1C test (Diabetic or Prediabetic)  09/27/2022    Lipids  10/09/2022    Prostate Specific Antigen (PSA) Screening or Monitoring  10/11/2022    Colorectal Cancer Screen  12/02/2024    Hepatitis A vaccine  Aged Out    Hib vaccine  Aged Out    Meningococcal (ACWY) vaccine  Aged Out     HPI    Patient is a 57-year-old obese  male who presents for diabetes, hyperlipidemia. He states that he is taking and tolerating his routine medications and needs a refill of them. He denies any fever, chills, chest pain, abdominal pain, shortness of breath. He has a good appetite and remains active and likes to go camping. Review of Systems   Constitutional: Negative for chills and fever. HENT: Negative for congestion. Respiratory: Negative for chest tightness and shortness of breath. Cardiovascular: Negative for chest pain. Gastrointestinal: Negative for abdominal pain and blood in stool. Genitourinary: Negative for dysuria and hematuria. Skin: Negative for rash. Neurological: Negative for dizziness. Psychiatric/Behavioral: Negative for dysphoric mood. Objective:   Physical Exam  Vitals and nursing note reviewed. Constitutional:       General: He is not in acute distress. Appearance: He is well-developed. HENT:      Head: Normocephalic and atraumatic. Right Ear: Tympanic membrane, ear canal and external ear normal.      Left Ear: Tympanic membrane, ear canal and external ear normal.      Nose: Nose normal.      Mouth/Throat:      Mouth: Mucous membranes are moist.      Pharynx: Oropharynx is clear. Eyes:      General: No scleral icterus. Right eye: No discharge. Left eye: No discharge. Conjunctiva/sclera: Conjunctivae normal.   Cardiovascular:      Rate and Rhythm: Normal rate and regular rhythm. Heart sounds: Normal heart sounds. Pulmonary:      Effort: Pulmonary effort is normal. No respiratory distress. Breath sounds: Normal breath sounds. No wheezing. Abdominal:      General: There is no distension. Palpations: Abdomen is soft. Tenderness: There is no abdominal tenderness. Musculoskeletal:      Cervical back: Neck supple. Skin:     General: Skin is warm and dry. Findings: No rash. Neurological:      Mental Status: He is alert and oriented to person, place, and time. Psychiatric:         Mood and Affect: Mood normal.         Behavior: Behavior normal.         Assessment:       Diagnosis Orders   1. Type 2 diabetes mellitus without complication, without long-term current use of insulin (HCC)  metFORMIN (GLUCOPHAGE-XR) 500 mg extended release tablet    Comprehensive Metabolic Panel    Lipid Panel    Hemoglobin A1C    Microalbumin, Ur   2. Mixed hyperlipidemia  atorvastatin (LIPITOR) 20 MG tablet    Comprehensive Metabolic Panel    Lipid Panel   3. Obesity (BMI 30.0-34. 9)             Plan:       Jacky Neff received counseling on the following healthy behaviors: nutrition, exercise and medication adherence  Reviewed prior labs and health maintenance  Continue current medications, diet and exercise. Discussed use, benefit, and side effects of prescribed medications. Barriers to medication compliance addressed. Patient given educational materials - see patient instructions  Was a self-tracking handout given in paper form or via [employer]? No:     Requested Prescriptions     Signed Prescriptions Disp Refills    atorvastatin (LIPITOR) 20 MG tablet 90 tablet 1     Sig: TAKE 1 TABLET BY MOUTH EVERY DAY    metFORMIN (GLUCOPHAGE-XR) 500 mg extended release tablet 30 tablet 5     Sig: Take 1 tablet by mouth daily (with breakfast)       All patient questions answered. Patient voiced understanding. Quality Measures    Body mass index is 32.45 kg/m². Elevated. Weight control planned discussed Healthy diet and regular exercise. BP: 120/72 Blood pressure is normal. Treatment plan consists of Weight Reduction.     Lab Results   Component Value Date    LDLCALC 66 10/09/2021    (goal LDL reduction with dx if diabetes is 50% LDL reduction)      PHQ Scores 6/6/2022 3/22/2021 10/26/2020 1/13/2020 2/19/2019 7/19/2018 5/4/2017   PHQ2 Score 0 0 0 0 0 0 0   PHQ9 Score 0 0 0 0 0 0 0     Interpretation of Total Score Depression Severity: 1-4 = Minimal depression, 5-9 = Mild depression, 10-14 = Moderate depression, 15-19 = Moderately severe depression, 20-27 = Severe depression  Orders Placed This Encounter   Procedures    Comprehensive Metabolic Panel     Fasting     Standing Status:   Future     Standing Expiration Date:   6/6/2023    Lipid Panel     Standing Status:   Future     Standing Expiration Date:   6/6/2023     Order Specific Question:   Is Patient Fasting?/# of Hours     Answer:    Fast 8-10 hours    Hemoglobin A1C     Standing Status:   Future     Standing Expiration Date:   6/6/2023    Microalbumin, Ur     Standing Status:   Future     Standing Expiration Date:   6/6/2023         Orders Placed This Encounter   Medications    atorvastatin (LIPITOR) 20 MG tablet     Sig: TAKE 1 TABLET BY MOUTH EVERY DAY     Dispense:  90 tablet     Refill:  1    metFORMIN (GLUCOPHAGE-XR) 500 mg extended release tablet     Sig: Take 1 tablet by mouth daily (with breakfast)     Dispense:  30 tablet     Refill:  5     Continue routine medication  Follow-up in 6 months or sooner if needed

## 2022-06-16 LAB
ALBUMIN SERPL-MCNC: 4.6 G/DL
ALP BLD-CCNC: 71 U/L
ALT SERPL-CCNC: 24 U/L
ANION GAP SERPL CALCULATED.3IONS-SCNC: 10 MMOL/L
AST SERPL-CCNC: 26 U/L
AVERAGE GLUCOSE: 131
BILIRUB SERPL-MCNC: 0.6 MG/DL (ref 0.1–1.4)
BUN BLDV-MCNC: 17 MG/DL
CALCIUM SERPL-MCNC: 9.3 MG/DL
CHLORIDE BLD-SCNC: 102 MMOL/L
CHOLESTEROL, TOTAL: 144 MG/DL
CHOLESTEROL/HDL RATIO: 2.9
CO2: 24 MMOL/L
CREAT SERPL-MCNC: 0.98 MG/DL
CREATININE URINE: 218.39 MG/DL
GFR CALCULATED: >60
GLUCOSE BLD-MCNC: 118 MG/DL
HBA1C MFR BLD: 6.2 %
HDLC SERPL-MCNC: 50 MG/DL (ref 35–70)
LDL CHOLESTEROL CALCULATED: 70 MG/DL (ref 0–160)
MICROALBUMIN/CREAT 24H UR: 1.2 MG/G{CREAT}
NONHDLC SERPL-MCNC: ABNORMAL MG/DL
POTASSIUM SERPL-SCNC: 4 MMOL/L
SODIUM BLD-SCNC: 136 MMOL/L
TOTAL PROTEIN: 7.3
TRIGL SERPL-MCNC: 122 MG/DL
VLDLC SERPL CALC-MCNC: 24 MG/DL

## 2022-06-17 DIAGNOSIS — E11.9 TYPE 2 DIABETES MELLITUS WITHOUT COMPLICATION, WITHOUT LONG-TERM CURRENT USE OF INSULIN (HCC): ICD-10-CM

## 2022-06-17 DIAGNOSIS — E78.2 MIXED HYPERLIPIDEMIA: ICD-10-CM

## 2022-06-23 ENCOUNTER — TELEPHONE (OUTPATIENT)
Dept: FAMILY MEDICINE CLINIC | Age: 57
End: 2022-06-23

## 2022-06-23 NOTE — TELEPHONE ENCOUNTER
Patient's labs were normal except for mildly elevated fasting glucose of 118 and hemoglobin A1c of 6.2. Patient is to continue on his current medications and follow a diabetic diet.

## 2022-07-18 ENCOUNTER — TELEPHONE (OUTPATIENT)
Dept: FAMILY MEDICINE CLINIC | Age: 57
End: 2022-07-18

## 2022-07-18 RX ORDER — CYCLOBENZAPRINE HCL 10 MG
10 TABLET ORAL 3 TIMES DAILY PRN
Qty: 30 TABLET | Refills: 0 | Status: SHIPPED | OUTPATIENT
Start: 2022-07-18 | End: 2022-07-28

## 2022-07-18 RX ORDER — NAPROXEN 500 MG/1
500 TABLET ORAL 2 TIMES DAILY PRN
Qty: 20 TABLET | Refills: 0 | Status: SHIPPED | OUTPATIENT
Start: 2022-07-18

## 2022-07-25 RX ORDER — NAPROXEN 500 MG/1
TABLET ORAL
Qty: 20 TABLET | Refills: 0 | OUTPATIENT
Start: 2022-07-25

## 2022-12-12 ENCOUNTER — OFFICE VISIT (OUTPATIENT)
Dept: FAMILY MEDICINE CLINIC | Age: 57
End: 2022-12-12
Payer: COMMERCIAL

## 2022-12-12 VITALS
WEIGHT: 176 LBS | SYSTOLIC BLOOD PRESSURE: 118 MMHG | DIASTOLIC BLOOD PRESSURE: 70 MMHG | BODY MASS INDEX: 32.19 KG/M2 | HEART RATE: 51 BPM | RESPIRATION RATE: 12 BRPM

## 2022-12-12 DIAGNOSIS — Z23 NEEDS FLU SHOT: ICD-10-CM

## 2022-12-12 DIAGNOSIS — E78.2 MIXED HYPERLIPIDEMIA: ICD-10-CM

## 2022-12-12 DIAGNOSIS — E66.9 OBESITY (BMI 30.0-34.9): ICD-10-CM

## 2022-12-12 DIAGNOSIS — E11.9 TYPE 2 DIABETES MELLITUS WITHOUT COMPLICATION, WITHOUT LONG-TERM CURRENT USE OF INSULIN (HCC): Primary | ICD-10-CM

## 2022-12-12 LAB — HBA1C MFR BLD: 6.6 %

## 2022-12-12 PROCEDURE — 99214 OFFICE O/P EST MOD 30 MIN: CPT | Performed by: FAMILY MEDICINE

## 2022-12-12 PROCEDURE — 90471 IMMUNIZATION ADMIN: CPT | Performed by: FAMILY MEDICINE

## 2022-12-12 PROCEDURE — 90674 CCIIV4 VAC NO PRSV 0.5 ML IM: CPT | Performed by: FAMILY MEDICINE

## 2022-12-12 PROCEDURE — 83036 HEMOGLOBIN GLYCOSYLATED A1C: CPT | Performed by: FAMILY MEDICINE

## 2022-12-12 PROCEDURE — 3044F HG A1C LEVEL LT 7.0%: CPT | Performed by: FAMILY MEDICINE

## 2022-12-12 RX ORDER — METFORMIN HYDROCHLORIDE 500 MG/1
500 TABLET, EXTENDED RELEASE ORAL 2 TIMES DAILY WITH MEALS
Qty: 60 TABLET | Refills: 5 | Status: SHIPPED | OUTPATIENT
Start: 2022-12-12

## 2022-12-12 ASSESSMENT — ENCOUNTER SYMPTOMS
ABDOMINAL PAIN: 0
BLOOD IN STOOL: 0
CHEST TIGHTNESS: 0
SHORTNESS OF BREATH: 0

## 2022-12-12 NOTE — PROGRESS NOTES
Subjective:      Patient ID: Crystal Rivera is a 59 y.o. male. HPI  Chronic Disease Visit Information    BP Readings from Last 3 Encounters:   12/12/22 118/70   06/06/22 120/72   03/21/22 119/80          Hemoglobin A1C (%)   Date Value   06/16/2022 6.2   09/27/2021 6.0   03/22/2021 5.9     LDL Calculated (mg/dL)   Date Value   06/16/2022 70     HDL (mg/dL)   Date Value   06/16/2022 50     BUN (mg/dL)   Date Value   06/16/2022 17     Creatinine (no units)   Date Value   06/16/2022 0.98     Glucose (mg/dL)   Date Value   06/16/2022 118            Have you changed or started any medications since your last visit including any over-the-counter medicines, vitamins, or herbal medicines? no   Are you having any side effects from any of your medications? -  no  Have you stopped taking any of your medications? Is so, why? -  no    Have you seen any other physician or provider since your last visit? No  Have you had any other diagnostic tests since your last visit? No  Have you been seen in the emergency room and/or had an admission to a hospital since we last saw you? No  Have you had your annual diabetic retinal (eye) exam? No over 2 years ago  Have you had your routine dental cleaning in the past 6 months? yes - pt goes every 3 mos    Have you activated your [employer] account? If not, what are your barriers?  No:      Patient Care Team:  Ayah Contreras MD as PCP - General (Family Medicine)  Ayah Contreras MD as PCP - [facility] Provider  Trevor Nguyen MD as Consulting Physician (Gastroenterology)         Medical History Review  Past Medical, Family, and Social History reviewed and does contribute to the patient presenting condition    Health Maintenance   Topic Date Due    HIV screen  Never done    Hepatitis C screen  Never done    Hepatitis B vaccine (1 of 3 - Risk 3-dose series) Never done    DTaP/Tdap/Td vaccine (1 - Tdap) Never done    Shingles vaccine (1 of 2) Never done    Diabetic retinal icterus. Right eye: No discharge. Left eye: No discharge. Conjunctiva/sclera: Conjunctivae normal.   Cardiovascular:      Rate and Rhythm: Normal rate and regular rhythm. Heart sounds: Normal heart sounds. Pulmonary:      Effort: Pulmonary effort is normal. No respiratory distress. Breath sounds: Normal breath sounds. No wheezing. Abdominal:      General: There is no distension. Palpations: Abdomen is soft. Tenderness: There is no abdominal tenderness. Musculoskeletal:      Cervical back: Neck supple. Skin:     General: Skin is warm and dry. Findings: No rash. Neurological:      Mental Status: He is alert and oriented to person, place, and time. Psychiatric:         Mood and Affect: Mood normal.         Behavior: Behavior normal.       Assessment:       Diagnosis Orders   1. Type 2 diabetes mellitus without complication, without long-term current use of insulin ([facility])  POCT glycosylated hemoglobin (Hb A1C)    ALT    AST    Basic Metabolic Panel    Lipid Panel    Magnesium    metFORMIN (GLUCOPHAGE-XR) 500 MG extended release tablet      2. Needs flu shot  Influenza, FLUCELVAX, (age 10 mo+), IM, Preservative Free, 0.5 mL      3. Mixed hyperlipidemia  ALT    AST    Basic Metabolic Panel    Lipid Panel      4. Obesity (BMI 30.0-34. 9)                Plan:      Orders Placed This Encounter   Procedures    Influenza, FLUCELVAX, (age 10 mo+), IM, Preservative Free, 0.5 mL    ALT     Standing Status:   Future     Standing Expiration Date:   12/12/2023    AST     Standing Status:   Future     Standing Expiration Date:   63/64/9103    Basic Metabolic Panel     Fasting     Standing Status:   Future     Standing Expiration Date:   12/12/2023    Lipid Panel     Standing Status:   Future     Standing Expiration Date:   12/12/2023     Order Specific Question:   Is Patient Fasting?/# of Hours     Answer:    Fast 8-10 hours    Magnesium     Standing Status:   Future     Standing Expiration Date:   12/12/2023    POCT glycosylated hemoglobin (Hb A1C)      Orders Placed This Encounter   Medications    metFORMIN (GLUCOPHAGE-XR) 500 MG extended release tablet     Sig: Take 1 tablet by mouth 2 times daily (with meals)     Dispense:  60 tablet     Refill:  5   Patient encouraged to follow his diabetic diet more strictly  Metformin dose was increased to 500 mg twice daily  Continue other routine medication  Follow-up in 4 to 5 months

## 2022-12-19 LAB
ALT SERPL-CCNC: 27 U/L
AST SERPL-CCNC: 37 U/L
BUN BLDV-MCNC: 21 MG/DL
CALCIUM SERPL-MCNC: 8.8 MG/DL
CHLORIDE BLD-SCNC: 103 MMOL/L
CHOLESTEROL, TOTAL: 106 MG/DL
CHOLESTEROL/HDL RATIO: 2.1
CO2: 25 MMOL/L
CREAT SERPL-MCNC: 0.9 MG/DL
GFR CALCULATED: >90
GLUCOSE BLD-MCNC: 79 MG/DL
HDLC SERPL-MCNC: 50 MG/DL (ref 35–70)
LDL CHOLESTEROL CALCULATED: 44 MG/DL (ref 0–160)
MAGNESIUM: 2.1 MG/DL
NONHDLC SERPL-MCNC: ABNORMAL MG/DL
POTASSIUM SERPL-SCNC: 4.1 MMOL/L
SODIUM BLD-SCNC: 137 MMOL/L
TRIGL SERPL-MCNC: 59 MG/DL
VLDLC SERPL CALC-MCNC: 12 MG/DL

## 2022-12-21 DIAGNOSIS — E78.2 MIXED HYPERLIPIDEMIA: ICD-10-CM

## 2022-12-21 DIAGNOSIS — E11.9 TYPE 2 DIABETES MELLITUS WITHOUT COMPLICATION, WITHOUT LONG-TERM CURRENT USE OF INSULIN (HCC): ICD-10-CM

## 2023-03-11 DIAGNOSIS — E11.9 TYPE 2 DIABETES MELLITUS WITHOUT COMPLICATION, WITHOUT LONG-TERM CURRENT USE OF INSULIN (HCC): ICD-10-CM

## 2023-03-13 DIAGNOSIS — E11.9 TYPE 2 DIABETES MELLITUS WITHOUT COMPLICATION, WITHOUT LONG-TERM CURRENT USE OF INSULIN (HCC): ICD-10-CM

## 2023-03-13 RX ORDER — METFORMIN HYDROCHLORIDE 500 MG/1
500 TABLET, EXTENDED RELEASE ORAL 2 TIMES DAILY WITH MEALS
Qty: 180 TABLET | Refills: 0 | Status: SHIPPED | OUTPATIENT
Start: 2023-03-13

## 2023-03-13 RX ORDER — METFORMIN HYDROCHLORIDE 500 MG/1
TABLET, EXTENDED RELEASE ORAL
Qty: 180 TABLET | OUTPATIENT
Start: 2023-03-13

## 2023-05-08 ENCOUNTER — OFFICE VISIT (OUTPATIENT)
Dept: FAMILY MEDICINE CLINIC | Age: 58
End: 2023-05-08
Payer: COMMERCIAL

## 2023-05-08 VITALS
HEART RATE: 54 BPM | DIASTOLIC BLOOD PRESSURE: 69 MMHG | RESPIRATION RATE: 14 BRPM | SYSTOLIC BLOOD PRESSURE: 116 MMHG | HEIGHT: 62 IN | BODY MASS INDEX: 32.19 KG/M2 | OXYGEN SATURATION: 95 %

## 2023-05-08 DIAGNOSIS — E66.9 OBESITY (BMI 30.0-34.9): ICD-10-CM

## 2023-05-08 DIAGNOSIS — E11.9 TYPE 2 DIABETES MELLITUS WITHOUT COMPLICATION, WITHOUT LONG-TERM CURRENT USE OF INSULIN (HCC): Primary | ICD-10-CM

## 2023-05-08 DIAGNOSIS — E78.2 MIXED HYPERLIPIDEMIA: ICD-10-CM

## 2023-05-08 LAB — HBA1C MFR BLD: 5.9 %

## 2023-05-08 PROCEDURE — 99214 OFFICE O/P EST MOD 30 MIN: CPT | Performed by: FAMILY MEDICINE

## 2023-05-08 PROCEDURE — 83036 HEMOGLOBIN GLYCOSYLATED A1C: CPT | Performed by: FAMILY MEDICINE

## 2023-05-08 PROCEDURE — 3044F HG A1C LEVEL LT 7.0%: CPT | Performed by: FAMILY MEDICINE

## 2023-05-08 SDOH — ECONOMIC STABILITY: INCOME INSECURITY: HOW HARD IS IT FOR YOU TO PAY FOR THE VERY BASICS LIKE FOOD, HOUSING, MEDICAL CARE, AND HEATING?: NOT HARD AT ALL

## 2023-05-08 SDOH — ECONOMIC STABILITY: HOUSING INSECURITY
IN THE LAST 12 MONTHS, WAS THERE A TIME WHEN YOU DID NOT HAVE A STEADY PLACE TO SLEEP OR SLEPT IN A SHELTER (INCLUDING NOW)?: NO

## 2023-05-08 SDOH — ECONOMIC STABILITY: FOOD INSECURITY: WITHIN THE PAST 12 MONTHS, THE FOOD YOU BOUGHT JUST DIDN'T LAST AND YOU DIDN'T HAVE MONEY TO GET MORE.: NEVER TRUE

## 2023-05-08 SDOH — ECONOMIC STABILITY: FOOD INSECURITY: WITHIN THE PAST 12 MONTHS, YOU WORRIED THAT YOUR FOOD WOULD RUN OUT BEFORE YOU GOT MONEY TO BUY MORE.: NEVER TRUE

## 2023-05-08 ASSESSMENT — PATIENT HEALTH QUESTIONNAIRE - PHQ9
1. LITTLE INTEREST OR PLEASURE IN DOING THINGS: 0
2. FEELING DOWN, DEPRESSED OR HOPELESS: 0
SUM OF ALL RESPONSES TO PHQ QUESTIONS 1-9: 0
SUM OF ALL RESPONSES TO PHQ9 QUESTIONS 1 & 2: 0
SUM OF ALL RESPONSES TO PHQ QUESTIONS 1-9: 0

## 2023-05-08 ASSESSMENT — ENCOUNTER SYMPTOMS
SHORTNESS OF BREATH: 0
BLOOD IN STOOL: 0
ABDOMINAL PAIN: 0
CHEST TIGHTNESS: 0

## 2023-05-08 NOTE — PROGRESS NOTES
Subjective:      Patient ID: Roxy Harris is a 59 y.o. male. HPI  Chronic Disease Visit Information    BP Readings from Last 3 Encounters:   05/08/23 116/69   12/12/22 118/70   06/06/22 120/72          Hemoglobin A1C (%)   Date Value   12/12/2022 6.6   06/16/2022 6.2   09/27/2021 6.0     LDL Calculated (mg/dL)   Date Value   12/19/2022 44     HDL (mg/dL)   Date Value   12/19/2022 50     BUN (mg/dL)   Date Value   12/19/2022 21     Creatinine (no units)   Date Value   12/19/2022 0.90     Glucose (mg/dL)   Date Value   12/19/2022 79            Have you changed or started any medications since your last visit including any over-the-counter medicines, vitamins, or herbal medicines? no   Are you having any side effects from any of your medications? -  no  Have you stopped taking any of your medications? Is so, why? -  no    Have you seen any other physician or provider since your last visit? No  Have you had any other diagnostic tests since your last visit? No  Have you been seen in the emergency room and/or had an admission to a hospital since we last saw you? No  Have you had your annual diabetic retinal (eye) exam?     RECORDS REQUESTED    Have you had your routine dental cleaning in the past 6 months? yes -     Have you activated your [employer] account? If not, what are your barriers?  No:      Patient Care Team:  Nakul Raza MD as PCP - General (Family Medicine)  [facility]  Maco Amezquita MD as Consulting Physician (Gastroenterology)         Medical History Review  Past Medical, Family, and Social History reviewed and does contribute to the patient presenting condition    Health Maintenance   Topic Date Due    HIV screen  Never done    Hepatitis C screen  Never done    Hepatitis B vaccine (1 of 3 - Risk 3-dose series) Never done    DTaP/Tdap/Td vaccine (1 - Tdap) Never done    Shingles vaccine (1 of 2) Never done    Diabetic retinal exam  04/16/2020

## 2023-05-10 ENCOUNTER — TELEPHONE (OUTPATIENT)
Dept: UROLOGY | Age: 58
End: 2023-05-10

## 2023-05-11 DIAGNOSIS — Z12.5 PROSTATE CANCER SCREENING: Primary | ICD-10-CM

## 2023-05-11 NOTE — TELEPHONE ENCOUNTER
Writer spoke with patient's spouse notfying them that PSA is needed prior to appt     Faxed over to [facility]

## 2023-05-18 DIAGNOSIS — E78.2 MIXED HYPERLIPIDEMIA: ICD-10-CM

## 2023-05-18 RX ORDER — ATORVASTATIN CALCIUM 20 MG/1
TABLET, FILM COATED ORAL
Qty: 90 TABLET | Refills: 1 | Status: SHIPPED | OUTPATIENT
Start: 2023-05-18

## 2023-05-18 RX ORDER — CALCIUM CITRATE/VITAMIN D3 200MG-6.25
TABLET ORAL
Qty: 100 STRIP | Refills: 3 | Status: SHIPPED | OUTPATIENT
Start: 2023-05-18

## 2023-05-22 LAB
ALT SERPL-CCNC: 27 U/L
AST SERPL-CCNC: 36 U/L
BUN BLDV-MCNC: 17 MG/DL
CALCIUM SERPL-MCNC: 9.4 MG/DL
CHLORIDE BLD-SCNC: 102 MMOL/L
CHOLESTEROL, TOTAL: 139 MG/DL
CHOLESTEROL/HDL RATIO: 2.6
CO2: 27 MMOL/L
CREAT SERPL-MCNC: 0.9 MG/DL
EGFR: 98
GLUCOSE BLD-MCNC: 117 MG/DL
HDLC SERPL-MCNC: 53 MG/DL (ref 35–70)
LDL CHOLESTEROL CALCULATED: 72 MG/DL (ref 0–160)
MAGNESIUM: 2.1 MG/DL
NONHDLC SERPL-MCNC: NORMAL MG/DL
POTASSIUM SERPL-SCNC: 4.2 MMOL/L
SODIUM BLD-SCNC: 138 MMOL/L
TRIGL SERPL-MCNC: 69 MG/DL
VITAMIN B-12: 312
VLDLC SERPL CALC-MCNC: 14 MG/DL

## 2023-05-23 DIAGNOSIS — E11.9 TYPE 2 DIABETES MELLITUS WITHOUT COMPLICATION, WITHOUT LONG-TERM CURRENT USE OF INSULIN (HCC): ICD-10-CM

## 2023-05-23 DIAGNOSIS — E78.2 MIXED HYPERLIPIDEMIA: ICD-10-CM

## 2023-05-23 DIAGNOSIS — E66.9 OBESITY (BMI 30.0-34.9): ICD-10-CM

## 2023-06-05 ENCOUNTER — OFFICE VISIT (OUTPATIENT)
Dept: UROLOGY | Age: 58
End: 2023-06-05
Payer: COMMERCIAL

## 2023-06-05 VITALS
SYSTOLIC BLOOD PRESSURE: 118 MMHG | HEART RATE: 50 BPM | DIASTOLIC BLOOD PRESSURE: 75 MMHG | WEIGHT: 168 LBS | HEIGHT: 62 IN | BODY MASS INDEX: 30.91 KG/M2 | TEMPERATURE: 98.5 F

## 2023-06-05 DIAGNOSIS — Z12.5 PROSTATE CANCER SCREENING: Primary | ICD-10-CM

## 2023-06-05 DIAGNOSIS — N52.01 ERECTILE DYSFUNCTION DUE TO ARTERIAL INSUFFICIENCY: ICD-10-CM

## 2023-06-05 PROCEDURE — 99213 OFFICE O/P EST LOW 20 MIN: CPT | Performed by: UROLOGY

## 2023-06-05 RX ORDER — SILDENAFIL 100 MG/1
100 TABLET, FILM COATED ORAL DAILY PRN
Qty: 30 TABLET | Refills: 1 | Status: SHIPPED | OUTPATIENT
Start: 2023-06-05 | End: 2023-06-05

## 2023-06-05 RX ORDER — SILDENAFIL 100 MG/1
100 TABLET, FILM COATED ORAL DAILY PRN
Qty: 30 TABLET | Refills: 1 | Status: SHIPPED | OUTPATIENT
Start: 2023-06-05

## 2023-06-05 ASSESSMENT — ENCOUNTER SYMPTOMS
ABDOMINAL PAIN: 0
EYE REDNESS: 0
WHEEZING: 0
DIARRHEA: 0
COUGH: 0
BACK PAIN: 0
SHORTNESS OF BREATH: 0
VOMITING: 0
NAUSEA: 0
CONSTIPATION: 0
EYE PAIN: 0

## 2023-06-05 NOTE — PROGRESS NOTES
[address] [address]  Dept: [address] [facility] Urology Office Note - Established    Patient:  Marcella Mariscal  YOB: 1965  Date: 6/5/2023    The patient is a 59 y.o. male who presents todayfor evaluation of the following problems:   Chief Complaint   Patient presents with    Follow-up     Yearly with PSA (Care Everywhere)       HPI  This is a very pleasant 25-year-old gentleman who has erectile dysfunction. He was previously doing well with daily tadalafil but this no longer seems to be giving him the desired results. He is continues to have a good libido. He has no bothersome urinary symptoms. His recent PSA is 1.19. Summary of old records: N/A    Additional History: N/A    Procedures Today: N/A    Urinalysis today:  No results found for this visit on 06/05/23. Last several PSA's:  Lab Results   Component Value Date    PSA 0.98 10/11/2021    PSA 1.08 03/24/2018     Last total testosterone:  No results found for: TESTOSTERONE    AUA Symptom Score (6/5/2023):                                Last BUN and creatinine:  Lab Results   Component Value Date    BUN 17 05/22/2023     Lab Results   Component Value Date    CREATININE 0.90 05/22/2023       Additional Lab/Culture results: none    Imaging Reviewed during this Office Visit: none  (results were independently reviewed by physician and radiology report verified)    PAST MEDICAL, FAMILY AND SOCIAL HISTORY UPDATE:  Past Medical History:   Diagnosis Date    DM2 (diabetes mellitus, type 2) ([facility] [address] 6/9/2015    Hyperlipidemia     Obesity (BMI 30.0-34.9) 12/8/2015    Poor compliance      Past Surgical History:   Procedure Laterality Date    COLONOSCOPY  12/02/14    patient states no problem found    FOOT NEUROMA SURGERY Right 8/2/2017    FOOT NEUROMA EXCISION performed by Tiffanie Kessler DPM at [address] [facility]

## 2023-11-13 ENCOUNTER — OFFICE VISIT (OUTPATIENT)
Dept: FAMILY MEDICINE CLINIC | Age: 58
End: 2023-11-13
Payer: COMMERCIAL

## 2023-11-13 VITALS
RESPIRATION RATE: 14 BRPM | OXYGEN SATURATION: 98 % | DIASTOLIC BLOOD PRESSURE: 76 MMHG | WEIGHT: 171 LBS | HEART RATE: 54 BPM | BODY MASS INDEX: 31.28 KG/M2 | SYSTOLIC BLOOD PRESSURE: 110 MMHG

## 2023-11-13 DIAGNOSIS — E78.2 MIXED HYPERLIPIDEMIA: ICD-10-CM

## 2023-11-13 DIAGNOSIS — E11.9 TYPE 2 DIABETES MELLITUS WITHOUT COMPLICATION, WITHOUT LONG-TERM CURRENT USE OF INSULIN (HCC): Primary | ICD-10-CM

## 2023-11-13 DIAGNOSIS — Z23 NEEDS FLU SHOT: ICD-10-CM

## 2023-11-13 DIAGNOSIS — E66.9 OBESITY (BMI 30.0-34.9): ICD-10-CM

## 2023-11-13 DIAGNOSIS — Z11.59 NEED FOR HEPATITIS C SCREENING TEST: ICD-10-CM

## 2023-11-13 LAB — HBA1C MFR BLD: 5.9 %

## 2023-11-13 PROCEDURE — 90674 CCIIV4 VAC NO PRSV 0.5 ML IM: CPT | Performed by: FAMILY MEDICINE

## 2023-11-13 PROCEDURE — 99214 OFFICE O/P EST MOD 30 MIN: CPT | Performed by: FAMILY MEDICINE

## 2023-11-13 PROCEDURE — 83036 HEMOGLOBIN GLYCOSYLATED A1C: CPT | Performed by: FAMILY MEDICINE

## 2023-11-13 PROCEDURE — 3044F HG A1C LEVEL LT 7.0%: CPT | Performed by: FAMILY MEDICINE

## 2023-11-13 PROCEDURE — 90471 IMMUNIZATION ADMIN: CPT | Performed by: FAMILY MEDICINE

## 2023-11-13 SDOH — ECONOMIC STABILITY: INCOME INSECURITY: HOW HARD IS IT FOR YOU TO PAY FOR THE VERY BASICS LIKE FOOD, HOUSING, MEDICAL CARE, AND HEATING?: NOT HARD AT ALL

## 2023-11-13 SDOH — ECONOMIC STABILITY: FOOD INSECURITY: WITHIN THE PAST 12 MONTHS, THE FOOD YOU BOUGHT JUST DIDN'T LAST AND YOU DIDN'T HAVE MONEY TO GET MORE.: NEVER TRUE

## 2023-11-13 SDOH — ECONOMIC STABILITY: FOOD INSECURITY: WITHIN THE PAST 12 MONTHS, YOU WORRIED THAT YOUR FOOD WOULD RUN OUT BEFORE YOU GOT MONEY TO BUY MORE.: NEVER TRUE

## 2023-11-13 ASSESSMENT — PATIENT HEALTH QUESTIONNAIRE - PHQ9
1. LITTLE INTEREST OR PLEASURE IN DOING THINGS: 0
SUM OF ALL RESPONSES TO PHQ QUESTIONS 1-9: 0
2. FEELING DOWN, DEPRESSED OR HOPELESS: 0
SUM OF ALL RESPONSES TO PHQ QUESTIONS 1-9: 0
SUM OF ALL RESPONSES TO PHQ9 QUESTIONS 1 & 2: 0
SUM OF ALL RESPONSES TO PHQ QUESTIONS 1-9: 0
SUM OF ALL RESPONSES TO PHQ QUESTIONS 1-9: 0

## 2023-11-13 ASSESSMENT — ENCOUNTER SYMPTOMS
ABDOMINAL PAIN: 0
SHORTNESS OF BREATH: 0
CHEST TIGHTNESS: 0
BLOOD IN STOOL: 0

## 2023-11-13 NOTE — PROGRESS NOTES
Subjective:      Patient ID: Yuval Merida is a 59 y.o. male. HPI  Chronic Disease Visit Information    BP Readings from Last 3 Encounters:   11/13/23 110/76   06/05/23 118/75   05/08/23 116/69          Hemoglobin A1C (%)   Date Value   05/08/2023 5.9   12/12/2022 6.6   06/16/2022 6.2     LDL Calculated (mg/dL)   Date Value   05/22/2023 72     HDL (mg/dL)   Date Value   05/22/2023 53     BUN (mg/dL)   Date Value   05/22/2023 17     Creatinine (no units)   Date Value   05/22/2023 0.90     Glucose (mg/dL)   Date Value   05/22/2023 117            Have you changed or started any medications since your last visit including any over-the-counter medicines, vitamins, or herbal medicines? no   Are you having any side effects from any of your medications? -  no  Have you stopped taking any of your medications? Is so, why? -  no    Have you seen any other physician or provider since your last visit? Yes - Records Obtained  Have you had any other diagnostic tests since your last visit? No  Have you been seen in the emergency room and/or had an admission to a hospital since we last saw you? No  Have you had your annual diabetic retinal (eye) exam? Yes - Records Requested  Have you had your routine dental cleaning in the past 6 months? yes -     Have you activated your [employer] account? If not, what are your barriers?  No:      Patient Care Team:  Getachew Enriquez MD as PCP - General (Family Medicine)  Getachew Enriquez MD as PCP - Empaneled Provider  John Hughes MD as Consulting Physician (Gastroenterology)         Medical History Review  Past Medical, Family, and Social History reviewed and does contribute to the patient presenting condition    Health Maintenance   Topic Date Due    HIV screen  Never done    Diabetic Alb to Cr ratio (uACR) test  Never done    Hepatitis C screen  Never done    DTaP/Tdap/Td vaccine (1 - Tdap) Never done    Shingles vaccine (1 of 2) Never done    Diabetic retinal exam

## 2023-11-20 DIAGNOSIS — E11.9 TYPE 2 DIABETES MELLITUS WITHOUT COMPLICATION, WITHOUT LONG-TERM CURRENT USE OF INSULIN (HCC): ICD-10-CM

## 2023-11-20 DIAGNOSIS — E78.2 MIXED HYPERLIPIDEMIA: ICD-10-CM

## 2023-11-20 RX ORDER — ATORVASTATIN CALCIUM 10 MG/1
TABLET, FILM COATED ORAL
Qty: 90 TABLET | Refills: 0 | OUTPATIENT
Start: 2023-11-20

## 2023-11-20 RX ORDER — METFORMIN HYDROCHLORIDE 500 MG/1
500 TABLET, EXTENDED RELEASE ORAL 2 TIMES DAILY WITH MEALS
Qty: 180 TABLET | Refills: 1 | Status: SHIPPED | OUTPATIENT
Start: 2023-11-20

## 2023-11-20 RX ORDER — ATORVASTATIN CALCIUM 20 MG/1
20 TABLET, FILM COATED ORAL DAILY
Qty: 90 TABLET | Refills: 1 | Status: SHIPPED | OUTPATIENT
Start: 2023-11-20

## 2023-11-21 DIAGNOSIS — E78.2 MIXED HYPERLIPIDEMIA: ICD-10-CM

## 2023-11-21 RX ORDER — ATORVASTATIN CALCIUM 20 MG/1
20 TABLET, FILM COATED ORAL DAILY
Qty: 90 TABLET | Refills: 1 | OUTPATIENT
Start: 2023-11-21

## 2023-11-25 LAB
ALBUMIN SERPL-MCNC: 4.5 G/DL
ALP BLD-CCNC: 75 U/L
ALT SERPL-CCNC: 23 U/L
ANION GAP SERPL CALCULATED.3IONS-SCNC: 9 MMOL/L
AST SERPL-CCNC: 25 U/L
BASOPHILS ABSOLUTE: 0.1 /ΜL
BASOPHILS RELATIVE PERCENT: 1.1 %
BILIRUB SERPL-MCNC: 0.5 MG/DL (ref 0.1–1.4)
BUN BLDV-MCNC: 22 MG/DL
CALCIUM SERPL-MCNC: 9 MG/DL
CHLORIDE BLD-SCNC: 102 MMOL/L
CHOLESTEROL, TOTAL: 173 MG/DL
CHOLESTEROL/HDL RATIO: 3.2
CO2: 26 MMOL/L
CREAT SERPL-MCNC: 0.88 MG/DL
EGFR: >90
EOSINOPHILS ABSOLUTE: 0.1 /ΜL
EOSINOPHILS RELATIVE PERCENT: 2.5 %
GLUCOSE BLD-MCNC: 112 MG/DL
HCT VFR BLD CALC: 46.5 % (ref 41–53)
HDLC SERPL-MCNC: 54 MG/DL (ref 35–70)
HEMOGLOBIN: 15.8 G/DL (ref 13.5–17.5)
LDL CHOLESTEROL CALCULATED: 105 MG/DL (ref 0–160)
LYMPHOCYTES ABSOLUTE: 2.4 /ΜL
LYMPHOCYTES RELATIVE PERCENT: 49.3 %
MAGNESIUM: 2.2 MG/DL
MCH RBC QN AUTO: 31.2 PG
MCHC RBC AUTO-ENTMCNC: 34 G/DL
MCV RBC AUTO: 92 FL
MONOCYTES ABSOLUTE: 0.4 /ΜL
MONOCYTES RELATIVE PERCENT: 7.9 %
NEUTROPHILS ABSOLUTE: 1.9 /ΜL
NEUTROPHILS RELATIVE PERCENT: 39.2 %
NONHDLC SERPL-MCNC: NORMAL MG/DL
PDW BLD-RTO: 13.6 %
PLATELET # BLD: 272 K/ΜL
PMV BLD AUTO: 7.6 FL
POTASSIUM SERPL-SCNC: 4.3 MMOL/L
RBC # BLD: 5.07 10^6/ΜL
SODIUM BLD-SCNC: 137 MMOL/L
TOTAL PROTEIN: 7.5
TRIGL SERPL-MCNC: 70 MG/DL
VITAMIN B-12: 193
VLDLC SERPL CALC-MCNC: 14 MG/DL
WBC # BLD: 4.9 10^3/ML

## 2023-11-27 DIAGNOSIS — E11.9 TYPE 2 DIABETES MELLITUS WITHOUT COMPLICATION, WITHOUT LONG-TERM CURRENT USE OF INSULIN (HCC): ICD-10-CM

## 2023-11-27 DIAGNOSIS — E78.2 MIXED HYPERLIPIDEMIA: ICD-10-CM

## 2023-11-27 DIAGNOSIS — E66.9 OBESITY (BMI 30.0-34.9): ICD-10-CM

## 2024-05-17 ENCOUNTER — TELEPHONE (OUTPATIENT)
Dept: FAMILY MEDICINE CLINIC | Age: 59
End: 2024-05-17

## 2024-05-28 DIAGNOSIS — E11.9 TYPE 2 DIABETES MELLITUS WITHOUT COMPLICATION, WITHOUT LONG-TERM CURRENT USE OF INSULIN (HCC): Primary | ICD-10-CM

## 2024-05-28 DIAGNOSIS — E78.2 MIXED HYPERLIPIDEMIA: ICD-10-CM

## 2024-05-28 DIAGNOSIS — Z12.5 SCREENING FOR PROSTATE CANCER: ICD-10-CM

## 2024-06-03 DIAGNOSIS — E78.2 MIXED HYPERLIPIDEMIA: ICD-10-CM

## 2024-06-04 RX ORDER — ATORVASTATIN CALCIUM 20 MG/1
20 TABLET, FILM COATED ORAL DAILY
Qty: 90 TABLET | Refills: 1 | Status: SHIPPED | OUTPATIENT
Start: 2024-06-04

## 2024-11-01 LAB
ALBUMIN: 4.6 G/DL
ALP BLD-CCNC: 73 U/L
ALT SERPL-CCNC: 25 U/L
ANION GAP SERPL CALCULATED.3IONS-SCNC: 10 MMOL/L
AST SERPL-CCNC: 31 U/L
BILIRUB SERPL-MCNC: 0.9 MG/DL (ref 0.1–1.4)
BUN BLDV-MCNC: 18 MG/DL
CALCIUM SERPL-MCNC: 9.1 MG/DL
CHLORIDE BLD-SCNC: 102 MMOL/L
CHOLESTEROL, TOTAL: 139 MG/DL
CHOLESTEROL/HDL RATIO: 2.7
CO2: 24 MMOL/L
CREAT SERPL-MCNC: 0.9 MG/DL
CREATININE URINE: 97.32 MG/DL
ESTIMATED AVERAGE GLUCOSE: 134
GFR, ESTIMATED: >90
GLUCOSE BLD-MCNC: 106 MG/DL
HBA1C MFR BLD: 6.3 %
HDLC SERPL-MCNC: 51 MG/DL (ref 35–70)
LDL CHOLESTEROL: 75
MICROALBUMIN/CREAT 24H UR: <0.7 MG/DL
MICROALBUMIN/CREAT UR-RTO: NORMAL
NONHDLC SERPL-MCNC: ABNORMAL MG/DL
POTASSIUM SERPL-SCNC: 4.2 MMOL/L
PROSTATE SPECIFIC ANTIGEN: 1.41 NG/ML
SODIUM BLD-SCNC: 136 MMOL/L
TOTAL PROTEIN: 7.5 G/DL (ref 6.4–8.2)
TRIGL SERPL-MCNC: 64 MG/DL
VITAMIN B-12: 220
VLDLC SERPL CALC-MCNC: 13 MG/DL

## 2024-11-04 DIAGNOSIS — E78.2 MIXED HYPERLIPIDEMIA: ICD-10-CM

## 2024-11-04 DIAGNOSIS — E11.9 TYPE 2 DIABETES MELLITUS WITHOUT COMPLICATION, WITHOUT LONG-TERM CURRENT USE OF INSULIN (HCC): ICD-10-CM

## 2024-11-04 DIAGNOSIS — Z12.5 SCREENING FOR PROSTATE CANCER: ICD-10-CM

## 2024-11-15 ENCOUNTER — OFFICE VISIT (OUTPATIENT)
Dept: FAMILY MEDICINE CLINIC | Age: 59
End: 2024-11-15

## 2024-11-15 VITALS
SYSTOLIC BLOOD PRESSURE: 118 MMHG | BODY MASS INDEX: 32.2 KG/M2 | WEIGHT: 175 LBS | HEART RATE: 56 BPM | DIASTOLIC BLOOD PRESSURE: 76 MMHG | HEIGHT: 62 IN | RESPIRATION RATE: 16 BRPM

## 2024-11-15 DIAGNOSIS — Z23 NEEDS FLU SHOT: ICD-10-CM

## 2024-11-15 DIAGNOSIS — E66.811 OBESITY (BMI 30.0-34.9): ICD-10-CM

## 2024-11-15 DIAGNOSIS — E11.9 TYPE 2 DIABETES MELLITUS WITHOUT COMPLICATION, WITHOUT LONG-TERM CURRENT USE OF INSULIN (HCC): Primary | ICD-10-CM

## 2024-11-15 DIAGNOSIS — E78.2 MIXED HYPERLIPIDEMIA: ICD-10-CM

## 2024-11-15 SDOH — ECONOMIC STABILITY: FOOD INSECURITY: WITHIN THE PAST 12 MONTHS, YOU WORRIED THAT YOUR FOOD WOULD RUN OUT BEFORE YOU GOT MONEY TO BUY MORE.: NEVER TRUE

## 2024-11-15 SDOH — ECONOMIC STABILITY: FOOD INSECURITY: WITHIN THE PAST 12 MONTHS, THE FOOD YOU BOUGHT JUST DIDN'T LAST AND YOU DIDN'T HAVE MONEY TO GET MORE.: NEVER TRUE

## 2024-11-15 SDOH — ECONOMIC STABILITY: INCOME INSECURITY: HOW HARD IS IT FOR YOU TO PAY FOR THE VERY BASICS LIKE FOOD, HOUSING, MEDICAL CARE, AND HEATING?: NOT HARD AT ALL

## 2024-11-15 ASSESSMENT — PATIENT HEALTH QUESTIONNAIRE - PHQ9
1. LITTLE INTEREST OR PLEASURE IN DOING THINGS: NOT AT ALL
SUM OF ALL RESPONSES TO PHQ QUESTIONS 1-9: 0
SUM OF ALL RESPONSES TO PHQ QUESTIONS 1-9: 0
2. FEELING DOWN, DEPRESSED OR HOPELESS: NOT AT ALL
SUM OF ALL RESPONSES TO PHQ QUESTIONS 1-9: 0
SUM OF ALL RESPONSES TO PHQ9 QUESTIONS 1 & 2: 0
SUM OF ALL RESPONSES TO PHQ QUESTIONS 1-9: 0

## 2024-11-15 ASSESSMENT — ENCOUNTER SYMPTOMS
BLOOD IN STOOL: 0
SHORTNESS OF BREATH: 0
CHEST TIGHTNESS: 0
ABDOMINAL PAIN: 0

## 2024-11-15 NOTE — PROGRESS NOTES
[facility]      Date of Visit:  11/15/2024  Patient Name: Misael Bettencourt   Patient :  1965     CHIEF COMPLAINT:     Misael Bettencourt is a 59 y.o. male who presents today for an general visit to be evaluated for the following condition(s):  Chief Complaint   Patient presents with    Diabetes     Micro ua done 24    Hyperlipidemia    Health Maintenance     Pneumo shot due    Flu Vaccine     Given to pt today       HISTORY OF PRESENT ILLNESS:       HPI   Chronic Disease Visit Information    BP Readings from Last 3 Encounters:   11/15/24 118/76   23 110/76   23 118/75          Hemoglobin A1C (%)   Date Value   2024 6.3 (H)   2023 5.9   2023 5.9     HDL (mg/dL)   Date Value   2024 51     BUN (mg/dL)   Date Value   2024 18     Creatinine (mg/dL)   Date Value   2024 0.90     Glucose (mg/dL)   Date Value   2024 106 (H)            Have you changed or started any medications since your last visit including any over-the-counter medicines, vitamins, or herbal medicines? no   Are you having any side effects from any of your medications? -  no  Have you stopped taking any of your medications? Is so, why? -  no    Have you seen any other physician or provider since your last visit? No  Have you had any other diagnostic tests since your last visit? No  Have you been seen in the emergency room and/or had an admission to a hospital since we last saw you? No  Have you had your annual diabetic retinal (eye) exam? No  Have you had your routine dental cleaning in the past 6 months? yes -     Have you activated your [employer] account? If not, what are your barriers? No:      Patient Care Team:  Vijay Morin MD as PCP - General (Family Medicine)  Vijay Morin MD as PCP - Empaneled Provider  Palomo Snell MD as Consulting Physician (Gastroenterology)         Medical History Review  Past Medical, Family, and Social History reviewed and

## 2025-04-17 ENCOUNTER — TELEPHONE (OUTPATIENT)
Dept: FAMILY MEDICINE CLINIC | Age: 60
End: 2025-04-17

## 2025-04-17 RX ORDER — CYCLOBENZAPRINE HCL 10 MG
10 TABLET ORAL 3 TIMES DAILY PRN
Qty: 30 TABLET | Refills: 0 | Status: SHIPPED | OUTPATIENT
Start: 2025-04-17 | End: 2025-04-27

## 2025-04-17 NOTE — TELEPHONE ENCOUNTER
Patient's wife, Kaci called stating Hans is having some back pain and pulled a muscle.  Requesting refill on Flexeril to [facility].  Please advise.

## 2025-05-20 ENCOUNTER — OFFICE VISIT (OUTPATIENT)
Dept: FAMILY MEDICINE CLINIC | Age: 60
End: 2025-05-20
Payer: COMMERCIAL

## 2025-05-20 DIAGNOSIS — E78.2 MIXED HYPERLIPIDEMIA: ICD-10-CM

## 2025-05-20 DIAGNOSIS — Z23 NEED FOR PNEUMOCOCCAL 20-VALENT CONJUGATE VACCINATION: ICD-10-CM

## 2025-05-20 DIAGNOSIS — E11.9 TYPE 2 DIABETES MELLITUS WITHOUT COMPLICATION, WITHOUT LONG-TERM CURRENT USE OF INSULIN (HCC): Primary | ICD-10-CM

## 2025-05-20 DIAGNOSIS — E66.811 OBESITY (BMI 30.0-34.9): ICD-10-CM

## 2025-05-20 DIAGNOSIS — Z12.11 SCREEN FOR COLON CANCER: ICD-10-CM

## 2025-05-20 LAB — HBA1C MFR BLD: 6.9 %

## 2025-05-20 PROCEDURE — 83036 HEMOGLOBIN GLYCOSYLATED A1C: CPT | Performed by: FAMILY MEDICINE

## 2025-05-20 PROCEDURE — 90677 PCV20 VACCINE IM: CPT | Performed by: FAMILY MEDICINE

## 2025-05-20 PROCEDURE — 3044F HG A1C LEVEL LT 7.0%: CPT | Performed by: FAMILY MEDICINE

## 2025-05-20 PROCEDURE — 90471 IMMUNIZATION ADMIN: CPT | Performed by: FAMILY MEDICINE

## 2025-05-20 PROCEDURE — 99214 OFFICE O/P EST MOD 30 MIN: CPT | Performed by: FAMILY MEDICINE

## 2025-05-20 RX ORDER — ATORVASTATIN CALCIUM 20 MG/1
20 TABLET, FILM COATED ORAL DAILY
Qty: 90 TABLET | Refills: 1 | Status: SHIPPED | OUTPATIENT
Start: 2025-05-20

## 2025-05-20 RX ORDER — METFORMIN HYDROCHLORIDE 500 MG/1
500 TABLET, EXTENDED RELEASE ORAL 2 TIMES DAILY WITH MEALS
Qty: 180 TABLET | Refills: 1 | Status: SHIPPED | OUTPATIENT
Start: 2025-05-20

## 2025-05-20 SDOH — ECONOMIC STABILITY: FOOD INSECURITY: WITHIN THE PAST 12 MONTHS, YOU WORRIED THAT YOUR FOOD WOULD RUN OUT BEFORE YOU GOT MONEY TO BUY MORE.: NEVER TRUE

## 2025-05-20 SDOH — ECONOMIC STABILITY: FOOD INSECURITY: WITHIN THE PAST 12 MONTHS, THE FOOD YOU BOUGHT JUST DIDN'T LAST AND YOU DIDN'T HAVE MONEY TO GET MORE.: NEVER TRUE

## 2025-05-20 ASSESSMENT — PATIENT HEALTH QUESTIONNAIRE - PHQ9
1. LITTLE INTEREST OR PLEASURE IN DOING THINGS: NOT AT ALL
SUM OF ALL RESPONSES TO PHQ QUESTIONS 1-9: 0
SUM OF ALL RESPONSES TO PHQ QUESTIONS 1-9: 0
2. FEELING DOWN, DEPRESSED OR HOPELESS: NOT AT ALL
SUM OF ALL RESPONSES TO PHQ QUESTIONS 1-9: 0
SUM OF ALL RESPONSES TO PHQ QUESTIONS 1-9: 0

## 2025-05-20 ASSESSMENT — ENCOUNTER SYMPTOMS
CHEST TIGHTNESS: 0
BLOOD IN STOOL: 0
SHORTNESS OF BREATH: 0
ABDOMINAL PAIN: 0

## 2025-05-20 NOTE — PROGRESS NOTES
Nose: Nose normal.      Mouth/Throat:      Mouth: Mucous membranes are moist.      Pharynx: Oropharynx is clear.   Eyes:      General: No scleral icterus.        Right eye: No discharge.         Left eye: No discharge.      Conjunctiva/sclera: Conjunctivae normal.   Cardiovascular:      Rate and Rhythm: Normal rate and regular rhythm.      Heart sounds: Normal heart sounds.   Pulmonary:      Effort: Pulmonary effort is normal. No respiratory distress.      Breath sounds: Normal breath sounds. No wheezing.   Abdominal:      General: There is no distension.      Palpations: Abdomen is soft.      Tenderness: There is no abdominal tenderness.   Musculoskeletal:      Cervical back: Neck supple.   Skin:     General: Skin is warm and dry.      Findings: No rash.   Neurological:      Mental Status: He is alert and oriented to person, place, and time.   Psychiatric:         Mood and Affect: Mood normal.         Behavior: Behavior normal.         ASSESSMENT/PLAN:     1. Type 2 diabetes mellitus without complication, without long-term current use of insulin (HCC)    - POCT glycosylated hemoglobin (Hb A1C)  - Albumin/Creatinine Ratio, Urine; Future  - metFORMIN (GLUCOPHAGE-XR) 500 MG extended release tablet; Take 1 tablet by mouth with breakfast and with evening meal  Dispense: 180 tablet; Refill: 1  - ALT; Future  - AST; Future  - Basic Metabolic Panel; Future  - Lipid Panel; Future  - Magnesium; Future  - Vitamin B12; Future  - Folate; Future  - TSH reflex to FT4; Future  Worsening.  Continue metformin and follow diabetic diet more strictly.  2. Mixed hyperlipidemia    - atorvastatin (LIPITOR) 20 MG tablet; Take 1 tablet by mouth daily  Dispense: 90 tablet; Refill: 1  - ALT; Future  - AST; Future  - Basic Metabolic Panel; Future  - Lipid Panel; Future  - Magnesium; Future  - Vitamin B12; Future  - Folate; Future  - TSH reflex to FT4; Future  Stable.  Continue current management with Lipitor  3. Obesity (BMI

## 2025-05-21 ENCOUNTER — TELEPHONE (OUTPATIENT)
Dept: GASTROENTEROLOGY | Age: 60
End: 2025-05-21

## 2025-05-21 NOTE — TELEPHONE ENCOUNTER
Patient has a urgent referral to get a colonoscopy scheduled, please call patient at [phone] best time to call is after 3:30 pm  psc/sc

## 2025-05-27 ENCOUNTER — PREP FOR PROCEDURE (OUTPATIENT)
Dept: GASTROENTEROLOGY | Age: 60
End: 2025-05-27

## 2025-05-27 DIAGNOSIS — Z12.11 SCREENING FOR COLON CANCER: ICD-10-CM

## 2025-06-11 ENCOUNTER — HOSPITAL ENCOUNTER (OUTPATIENT)
Dept: PREADMISSION TESTING | Age: 60
Discharge: HOME | End: 2025-06-15

## 2025-06-12 VITALS — WEIGHT: 175 LBS | BODY MASS INDEX: 32.2 KG/M2 | HEIGHT: 62 IN

## 2025-06-12 NOTE — PRE-PROCEDURE INSTRUCTIONS
No answer, left message ?       yes                      Unable to leave message ?    When were you told to arrive at hospital ?  0900    Do you have a  ?y    Are you on any blood thinners ?                     If yes when did you stop taking ?    Do you have your prep Rx filled and instruction ?      Nothing to eat the day before , only clear liquids.    Are you experiencing any covid symptoms ?     Do you have any infections or rash we should be aware of ?      Do you have the Hibiclens soap to use the night before and the morning of surgery ?    Nothing to eat or drink after midnight, only a sip of water to take any medication instructed to take the night before.  Wear comfortable clothing, leave any valuables at home, remove any jewelry and body piercing . y

## 2025-06-12 NOTE — PROGRESS NOTES
Pre-op Instructions For Out-Patient Endoscopy Surgery    Medication Instructions:  Please stop herbs and any supplements now (includes vitamins and minerals).    For these medications:  Dulaglutide (Trulicity), Exenatide (Byetta and Bydureon, Liraglutide (Victoza), Lixisenatide (Adlyxin), Semaglutide (Ozempic and Rybelsus), Tirzepatide (Mounjaro, Zepbound,Wegovy)- Stop 1 week prior if taking weekly or 1 day prior if taking every 12 hours or daily.     Please contact your surgeon and prescribing physician for pre-op instructions for any blood thinners.    If you have inhalers/aerosol treatments at home, please use them the morning of your surgery and bring the inhalers with you to the hospital.    Please take the following medications the morning of your surgery with a sip of water:    none    Surgery Instructions:  After midnight before surgery:  Do not eat or drink anything, including water, mints, gum, and hard candy.  You may brush your teeth without swallowing.  No smoking, chewing tobacco, or street drugs.   Will complete prep by 03:30 am then NPO    ** Please Follow Bowel Prep instructions if given by surgeon's office**    Please shower or bathe before surgery.       Please do not wear any cologne, lotion, powder, jewelry, piercings, perfume, makeup, nail polish, hair accessories, or hair spray on the day of surgery.  Wear loose comfortable clothing.    Leave your valuables at home but bring a payment source for any after-surgery prescriptions you plan to fill at [facility].  Bring a storage case for any glasses/contacts.    An adult who is responsible for you MUST remain in the hospital and drive you home and should be with you for the first 24 hours after surgery.     The Day of Surgery:  Arrive at [facility] Entr[facility] at the time directed by your surgeon and check in at the desk.     If you have a living will or healthcare power of , please bring a copy.    You will

## 2025-06-13 ENCOUNTER — ANESTHESIA EVENT (OUTPATIENT)
Dept: ENDOSCOPY | Age: 60
End: 2025-06-13
Payer: COMMERCIAL

## 2025-06-14 LAB
ALT SERPL-CCNC: 31 U/L
AST SERPL-CCNC: 27 U/L
BUN BLDV-MCNC: 16 MG/DL
CALCIUM SERPL-MCNC: 9 MG/DL
CHLORIDE BLD-SCNC: 103 MMOL/L
CHOLESTEROL, TOTAL: 234 MG/DL
CHOLESTEROL/HDL RATIO: 4.9
CO2: 23 MMOL/L
CREAT SERPL-MCNC: 0.9 MG/DL
CREATININE URINE: 30.01 MG/DL
EGFR: >90
GLUCOSE BLD-MCNC: 128 MG/DL
HDLC SERPL-MCNC: 48 MG/DL (ref 35–70)
LDL CHOLESTEROL: 158
MAGNESIUM: 2.3 MG/DL
MICROALBUMIN/CREAT 24H UR: <0.7 MG/DL
MICROALBUMIN/CREAT UR-RTO: NORMAL
NONHDLC SERPL-MCNC: ABNORMAL MG/DL
POTASSIUM SERPL-SCNC: 4.1 MMOL/L
SODIUM BLD-SCNC: 136 MMOL/L
TRIGL SERPL-MCNC: 139 MG/DL
TSH SERPL DL<=0.05 MIU/L-ACNC: 3.7 UIU/ML
VITAMIN B-12: 176
VLDLC SERPL CALC-MCNC: 28 MG/DL

## 2025-06-16 ENCOUNTER — RESULTS FOLLOW-UP (OUTPATIENT)
Dept: FAMILY MEDICINE CLINIC | Age: 60
End: 2025-06-16

## 2025-06-16 ENCOUNTER — HOSPITAL ENCOUNTER (OUTPATIENT)
Age: 60
Setting detail: OUTPATIENT SURGERY
Discharge: HOME | End: 2025-06-16
Attending: INTERNAL MEDICINE | Admitting: INTERNAL MEDICINE
Payer: COMMERCIAL

## 2025-06-16 ENCOUNTER — ANESTHESIA (OUTPATIENT)
Dept: ENDOSCOPY | Age: 60
End: 2025-06-16
Payer: COMMERCIAL

## 2025-06-16 VITALS
DIASTOLIC BLOOD PRESSURE: 64 MMHG | HEART RATE: 51 BPM | TEMPERATURE: 97.3 F | RESPIRATION RATE: 14 BRPM | OXYGEN SATURATION: 97 % | SYSTOLIC BLOOD PRESSURE: 108 MMHG

## 2025-06-16 DIAGNOSIS — E66.811 OBESITY (BMI 30.0-34.9): ICD-10-CM

## 2025-06-16 DIAGNOSIS — E53.8 VITAMIN B12 DEFICIENCY: Primary | ICD-10-CM

## 2025-06-16 DIAGNOSIS — E78.2 MIXED HYPERLIPIDEMIA: ICD-10-CM

## 2025-06-16 DIAGNOSIS — E11.9 TYPE 2 DIABETES MELLITUS WITHOUT COMPLICATION, WITHOUT LONG-TERM CURRENT USE OF INSULIN (HCC): ICD-10-CM

## 2025-06-16 LAB
GLUCOSE BLD-MCNC: 108 MG/DL (ref 75–110)
GLUCOSE BLD-MCNC: 125 MG/DL (ref 75–110)

## 2025-06-16 PROCEDURE — 7100000010 HC PHASE II RECOVERY - FIRST 15 MIN: Performed by: INTERNAL MEDICINE

## 2025-06-16 PROCEDURE — 3609027000 HC COLONOSCOPY: Performed by: INTERNAL MEDICINE

## 2025-06-16 PROCEDURE — 6360000002 HC RX W HCPCS

## 2025-06-16 PROCEDURE — 3700000000 HC ANESTHESIA ATTENDED CARE: Performed by: INTERNAL MEDICINE

## 2025-06-16 PROCEDURE — 82947 ASSAY GLUCOSE BLOOD QUANT: CPT

## 2025-06-16 PROCEDURE — 2580000003 HC RX 258: Performed by: ANESTHESIOLOGY

## 2025-06-16 PROCEDURE — 7100000011 HC PHASE II RECOVERY - ADDTL 15 MIN: Performed by: INTERNAL MEDICINE

## 2025-06-16 PROCEDURE — 45378 DIAGNOSTIC COLONOSCOPY: CPT | Performed by: INTERNAL MEDICINE

## 2025-06-16 PROCEDURE — 2709999900 HC NON-CHARGEABLE SUPPLY: Performed by: INTERNAL MEDICINE

## 2025-06-16 PROCEDURE — 3700000001 HC ADD 15 MINUTES (ANESTHESIA): Performed by: INTERNAL MEDICINE

## 2025-06-16 RX ORDER — SODIUM CHLORIDE 0.9 % (FLUSH) 0.9 %
5-40 SYRINGE (ML) INJECTION EVERY 12 HOURS SCHEDULED
Status: DISCONTINUED | OUTPATIENT
Start: 2025-06-16 | End: 2025-06-16 | Stop reason: HOSPADM

## 2025-06-16 RX ORDER — LIDOCAINE HYDROCHLORIDE 10 MG/ML
INJECTION, SOLUTION EPIDURAL; INFILTRATION; INTRACAUDAL; PERINEURAL
Status: DISCONTINUED | OUTPATIENT
Start: 2025-06-16 | End: 2025-06-16 | Stop reason: SDUPTHER

## 2025-06-16 RX ORDER — SODIUM CHLORIDE 9 MG/ML
INJECTION, SOLUTION INTRAVENOUS CONTINUOUS
Status: DISCONTINUED | OUTPATIENT
Start: 2025-06-16 | End: 2025-06-16 | Stop reason: HOSPADM

## 2025-06-16 RX ORDER — EZETIMIBE 10 MG/1
10 TABLET ORAL DAILY
Qty: 90 TABLET | Refills: 1 | Status: SHIPPED | OUTPATIENT
Start: 2025-06-16

## 2025-06-16 RX ORDER — SIMETHICONE 40MG/0.6ML
SUSPENSION, DROPS(FINAL DOSAGE FORM)(ML) ORAL PRN
Status: DISCONTINUED | OUTPATIENT
Start: 2025-06-16 | End: 2025-06-16 | Stop reason: ALTCHOICE

## 2025-06-16 RX ORDER — PROPOFOL 10 MG/ML
INJECTION, EMULSION INTRAVENOUS
Status: DISCONTINUED | OUTPATIENT
Start: 2025-06-16 | End: 2025-06-16 | Stop reason: SDUPTHER

## 2025-06-16 RX ORDER — SODIUM CHLORIDE 9 MG/ML
INJECTION, SOLUTION INTRAVENOUS PRN
Status: DISCONTINUED | OUTPATIENT
Start: 2025-06-16 | End: 2025-06-16 | Stop reason: HOSPADM

## 2025-06-16 RX ORDER — SODIUM CHLORIDE 0.9 % (FLUSH) 0.9 %
5-40 SYRINGE (ML) INJECTION PRN
Status: DISCONTINUED | OUTPATIENT
Start: 2025-06-16 | End: 2025-06-16 | Stop reason: HOSPADM

## 2025-06-16 RX ORDER — LIDOCAINE HYDROCHLORIDE 10 MG/ML
1 INJECTION, SOLUTION EPIDURAL; INFILTRATION; INTRACAUDAL; PERINEURAL
Status: DISCONTINUED | OUTPATIENT
Start: 2025-06-16 | End: 2025-06-16 | Stop reason: HOSPADM

## 2025-06-16 RX ADMIN — SODIUM CHLORIDE: 900 INJECTION, SOLUTION INTRAVENOUS at 10:31

## 2025-06-16 RX ADMIN — PROPOFOL 50 MG: 10 INJECTION, EMULSION INTRAVENOUS at 10:35

## 2025-06-16 RX ADMIN — PROPOFOL 150 MCG/KG/MIN: 10 INJECTION, EMULSION INTRAVENOUS at 10:36

## 2025-06-16 RX ADMIN — LIDOCAINE HYDROCHLORIDE 40 MG: 10 INJECTION, SOLUTION EPIDURAL; INFILTRATION; INTRACAUDAL; PERINEURAL at 10:36

## 2025-06-16 ASSESSMENT — ENCOUNTER SYMPTOMS
COUGH: 0
SORE THROAT: 0
SHORTNESS OF BREATH: 0

## 2025-06-16 ASSESSMENT — PAIN - FUNCTIONAL ASSESSMENT
PAIN_FUNCTIONAL_ASSESSMENT: 0-10
PAIN_FUNCTIONAL_ASSESSMENT: 0-10

## 2025-06-16 NOTE — H&P
HISTORY and PHYSICAL  [facility]       NAME:  Msiael Bettencourt  MRN: [id number]   YOB: 1965   Date: 6/16/2025   Age: 59 y.o.  Gender: male       COMPLAINT AND PRESENT HISTORY:       Misael Bettencourt is 59 y.o.  male, here for     Procedure(s):  COLORECTAL CANCER SCREENING, NOT HIGH RISK    Pre-Op Diagnosis Codes:      * Screening for colon cancer [Z12.11]    Pt had previous colonoscopy about 10 years ago.   Denies abdominal pain, dysphagia.  Denies heartburn.  Denies nausea, vomiting.  Denies diarrhea, constipation.  Denies blood in stool, dark tarry stools.  Denies changes in appetite and unintended weight loss.  Denies family history of colon cancer.    Completed and followed prescribed prep. NPO p MN. Stopped ibuprofen as instructed. Denies taking any other blood thinning medications. Denies recent or current chest pain/pressure, palpitations, SOB, recent URI, fever or chills.       RECENT LABS, IMAGING AND TESTING     Lab Results   Component Value Date    WBC 4.9 11/25/2023    RBC 5.07 11/25/2023    HGB 15.8 11/25/2023    HCT 46.5 11/25/2023    MCV 92 11/25/2023    MCH 31.2 11/25/2023    MCHC 34.0 11/25/2023    RDW 13.6 11/25/2023     11/25/2023    MPV 7.6 11/25/2023        Lab Results   Component Value Date     11/01/2024    K 4.2 11/01/2024     11/01/2024    CO2 24 11/01/2024    BUN 18 11/01/2024    CREATININE 0.90 11/01/2024    GLUCOSE 106 (H) 11/01/2024    CALCIUM 9.1 11/01/2024    BILITOT 0.9 11/01/2024    ALKPHOS 73 11/01/2024    AST 31 11/01/2024    ALT 25 11/01/2024       No results found.    PAST MEDICAL HISTORY     Past Medical History:   Diagnosis Date    DM2 (diabetes mellitus, type 2) ([facility]) 6/9/2015    Hyperlipidemia     Obesity (BMI 30.0-34.9) 12/8/2015    Poor compliance        SURGICAL HISTORY       Past Surgical History:   Procedure Laterality Date    CARPAL TUNNEL RELEASE      COLONOSCOPY  12/02/2014    patient states no problem found

## 2025-06-16 NOTE — ANESTHESIA POSTPROCEDURE EVALUATION
Department of Anesthesiology  Postprocedure Note    Patient: Misael Bettencourt  MRN: [id number]  YOB: 1965  Date of evaluation: 6/16/2025    Procedure Summary       Date: 06/16/25 Room / Location: [place] [address] / [facility]    Anesthesia Start: 1032 Anesthesia Stop: 1052    Procedure: COLORECTAL CANCER SCREENING, NOT HIGH RISK Diagnosis:       Screening for colon cancer      (Screening for colon cancer [Z12.11])    Surgeons: Veronica Rowe MD Responsible Provider: Josue Mcleod MD    Anesthesia Type: general, TIVA ASA Status: 2            Anesthesia Type: No value filed.    Debra Phase I: Debra Score: 10    Debra Phase II: Debra Score: 10    Anesthesia Post Evaluation    Comments: POST- ANESTHESIA EVALUATION       Pt Name: Misael Bettencourt  MRN: [id number]  YOB: 1965  Date of evaluation: 6/16/2025  Time:  12:00 PM      /64   Pulse 51   Temp 97.3 °F (36.3 °C) (Infrared)   Resp 14   SpO2 97%      Consciousness Level  Awake  Cardiopulmonary Status  Stable  Pain Adequately Treated YES  Nausea / Vomiting  NO  Adequate Hydration  YES  Anesthesia Related Complications NONE      Electronically signed by Josue Mcleod MD on 6/16/2025 at 12:00 PM           No notable events documented.

## 2025-06-16 NOTE — ANESTHESIA PRE PROCEDURE
Department of Anesthesiology  Preprocedure Note       Name:  iMsael Bettencourt   Age:  59 y.o.  :  1965                                          MRN:  [id number]         Date:  2025      Surgeon: Surgeon(s):  Veronica Rowe MD    Procedure: Procedure(s):  COLORECTAL CANCER SCREENING, NOT HIGH RISK    Medications prior to admission:   Prior to Admission medications    Medication Sig Start Date End Date Taking? Authorizing Provider   atorvastatin (LIPITOR) 20 MG tablet Take 1 tablet by mouth daily 25  Yes Vijay Morin MD   metFORMIN (GLUCOPHAGE-XR) 500 MG extended release tablet Take 1 tablet by mouth with breakfast and with evening meal 25  Yes Vijay Morin MD   sildenafil (VIAGRA) 100 MG tablet Take 1 tablet by mouth daily as needed for Erectile Dysfunction 23  Yes Khris Oneal MD   blood glucose test strips (TRUE METRIX BLOOD GLUCOSE TEST) strip TEST BLOOD SUGAR ONCE DAILY AS NEEDED 23  Yes Vijay Morin MD   ezetimibe (ZETIA) 10 MG tablet Take 1 tablet by mouth daily 25   Vijay Morin MD   ibuprofen (ADVIL;MOTRIN) 800 MG tablet 1 tablet 2 times daily as needed 20   Provider, MD Max       Current medications:    Current Facility-Administered Medications   Medication Dose Route Frequency Provider Last Rate Last Admin   • lidocaine PF 1 % injection 1 mL  1 mL IntraDERmal Once PRN Rosanna Hardin MD       • 0.9 % sodium chloride infusion   IntraVENous Continuous Rosanna Hardin MD       • sodium chloride flush 0.9 % injection 5-40 mL  5-40 mL IntraVENous 2 times per day Rosanna Hardin MD       • sodium chloride flush 0.9 % injection 5-40 mL  5-40 mL IntraVENous PRN Rosanna Hardin MD       • 0.9 % sodium chloride infusion   IntraVENous PRN Rosanna Hardin MD           Allergies:    Allergies   Allergen Reactions   • Tamsulosin        Problem List:    Patient Active Problem List   Diagnosis Code   • Mixed hyperlipidemia E78.2   • Benign

## 2025-06-16 NOTE — OP NOTE
PROCEDURE NOTE    DATE OF PROCEDURE: 6/16/2025    SURGEON: Veronica Rowe MD  Facility : [facility]  ASSISTANT: None  Anesthesia: Monitored anesthesia care  PREOPERATIVE DIAGNOSIS: Screening for colon cancer (average risk)      POSTOPERATIVE DIAGNOSIS: as described below    OPERATION: Total colonoscopy     ANESTHESIA: Moderate Sedation    ESTIMATED BLOOD LOSS: less than 50     COMPLICATIONS: None.     SPECIMENS:  Was Not Obtained    HISTORY: The patient is a 59 y.o. year old male with history of above preop diagnosis.  I recommended colonoscopy with possible biopsy or polypectomy and I explained the risk, benefits, expected outcome, and alternatives to the procedure.  Risks included but are not limited to bleeding, infection, respiratory distress, hypotension, and perforation of the colon and possibility of missing a lesion.  The patient understands and is in agreement.        PROCEDURE: The patient was given IV conscious sedation.  The patient's SPO2 remained above 90% throughout the procedure.     Digital rectal exam- normal    The colonoscope was inserted per rectum and advanced under direct vision to the cecum without difficulty.      Post sedation note :The patient's SPO2 remained above 90% throughout the procedure.the vital signs remained stable , and no immediate complication form the procedure noted, patient will be ready for d/c when criteria is met .        The prep was excellent.      Findings:  Terminal ileum: Distil 1 cm examined normal    Cecum: normal    Ascending colon: normal    Transverse colon: normal    Descending/Sigmoid colon: normal    Rectum/Anus: examined in normal and retroflexed positions and was normal; hemorrhoids small    Withdrawal Time was (minutes): 8    Diverticulosis: sigmoid (moderate)    The colon was decompressed and the scope was removed.  The patient tolerated the procedure well.     Impression: Normal exam    Recommendations/Plan:   Lifestyle and dietary modifications

## 2025-06-18 ENCOUNTER — HOSPITAL ENCOUNTER (EMERGENCY)
Age: 60
Discharge: HOME | End: 2025-06-18
Attending: EMERGENCY MEDICINE
Payer: COMMERCIAL

## 2025-06-18 ENCOUNTER — APPOINTMENT (OUTPATIENT)
Dept: CT IMAGING | Age: 60
End: 2025-06-18
Payer: COMMERCIAL

## 2025-06-18 VITALS
HEART RATE: 51 BPM | TEMPERATURE: 98.5 F | OXYGEN SATURATION: 98 % | SYSTOLIC BLOOD PRESSURE: 137 MMHG | RESPIRATION RATE: 16 BRPM | DIASTOLIC BLOOD PRESSURE: 67 MMHG

## 2025-06-18 DIAGNOSIS — R10.10 PAIN OF UPPER ABDOMEN: Primary | ICD-10-CM

## 2025-06-18 LAB
ALBUMIN SERPL-MCNC: 4.2 G/DL (ref 3.5–5.2)
ALP SERPL-CCNC: 66 U/L (ref 40–129)
ALT SERPL-CCNC: 37 U/L (ref 10–50)
ANION GAP SERPL CALCULATED.3IONS-SCNC: 12 MMOL/L (ref 9–16)
AST SERPL-CCNC: 38 U/L (ref 10–50)
BASOPHILS # BLD: 0.04 K/UL (ref 0–0.2)
BASOPHILS NFR BLD: 1 % (ref 0–2)
BILIRUB SERPL-MCNC: 0.7 MG/DL (ref 0–1.2)
BUN SERPL-MCNC: 19 MG/DL (ref 8–23)
CALCIUM SERPL-MCNC: 9 MG/DL (ref 8.6–10.4)
CHLORIDE SERPL-SCNC: 104 MMOL/L (ref 98–107)
CO2 SERPL-SCNC: 23 MMOL/L (ref 20–31)
CREAT SERPL-MCNC: 0.9 MG/DL (ref 0.7–1.2)
EOSINOPHIL # BLD: 0.13 K/UL (ref 0–0.44)
EOSINOPHILS RELATIVE PERCENT: 3 % (ref 0–4)
ERYTHROCYTE [DISTWIDTH] IN BLOOD BY AUTOMATED COUNT: 12.7 % (ref 11.5–14.9)
GFR, ESTIMATED: >90 ML/MIN/1.73M2
GLUCOSE SERPL-MCNC: 126 MG/DL (ref 74–99)
HCT VFR BLD AUTO: 41.2 % (ref 41–53)
HGB BLD-MCNC: 14.4 G/DL (ref 13.5–17.5)
IMM GRANULOCYTES # BLD AUTO: <0.03 K/UL (ref 0–0.3)
IMM GRANULOCYTES NFR BLD: 0 %
LACTATE BLDV-SCNC: 1 MMOL/L (ref 0.5–2.2)
LIPASE SERPL-CCNC: 41 U/L (ref 13–60)
LYMPHOCYTES NFR BLD: 2.02 K/UL (ref 1.1–3.7)
LYMPHOCYTES RELATIVE PERCENT: 45 % (ref 24–44)
MAGNESIUM SERPL-MCNC: 2.2 MG/DL (ref 1.6–2.4)
MCH RBC QN AUTO: 31.5 PG (ref 26–34)
MCHC RBC AUTO-ENTMCNC: 35 G/DL (ref 31–37)
MCV RBC AUTO: 90.2 FL (ref 80–100)
MONOCYTES NFR BLD: 0.46 K/UL (ref 0.1–1.2)
MONOCYTES NFR BLD: 10 % (ref 3–12)
NEUTROPHILS NFR BLD: 41 % (ref 36–66)
NEUTS SEG NFR BLD: 1.85 K/UL (ref 1.5–8.1)
NRBC BLD-RTO: 0 PER 100 WBC
PLATELET # BLD AUTO: 223 K/UL (ref 150–450)
PMV BLD AUTO: 8.8 FL (ref 8–13.5)
POTASSIUM SERPL-SCNC: 3.7 MMOL/L (ref 3.7–5.3)
PROT SERPL-MCNC: 7.1 G/DL (ref 6.6–8.7)
RBC # BLD AUTO: 4.57 M/UL (ref 4.21–5.77)
SODIUM SERPL-SCNC: 139 MMOL/L (ref 136–145)
WBC OTHER # BLD: 4.5 K/UL (ref 3.5–11)

## 2025-06-18 PROCEDURE — 6360000004 HC RX CONTRAST MEDICATION: Performed by: EMERGENCY MEDICINE

## 2025-06-18 PROCEDURE — 83735 ASSAY OF MAGNESIUM: CPT

## 2025-06-18 PROCEDURE — 2500000003 HC RX 250 WO HCPCS: Performed by: EMERGENCY MEDICINE

## 2025-06-18 PROCEDURE — 85025 COMPLETE CBC W/AUTO DIFF WBC: CPT

## 2025-06-18 PROCEDURE — 99285 EMERGENCY DEPT VISIT HI MDM: CPT

## 2025-06-18 PROCEDURE — 83690 ASSAY OF LIPASE: CPT

## 2025-06-18 PROCEDURE — 83605 ASSAY OF LACTIC ACID: CPT

## 2025-06-18 PROCEDURE — 2580000003 HC RX 258: Performed by: EMERGENCY MEDICINE

## 2025-06-18 PROCEDURE — 74177 CT ABD & PELVIS W/CONTRAST: CPT

## 2025-06-18 PROCEDURE — 96374 THER/PROPH/DIAG INJ IV PUSH: CPT

## 2025-06-18 PROCEDURE — 6360000002 HC RX W HCPCS: Performed by: EMERGENCY MEDICINE

## 2025-06-18 PROCEDURE — 80053 COMPREHEN METABOLIC PANEL: CPT

## 2025-06-18 PROCEDURE — 96375 TX/PRO/DX INJ NEW DRUG ADDON: CPT

## 2025-06-18 PROCEDURE — 36415 COLL VENOUS BLD VENIPUNCTURE: CPT

## 2025-06-18 RX ORDER — 0.9 % SODIUM CHLORIDE 0.9 %
100 INTRAVENOUS SOLUTION INTRAVENOUS ONCE
Status: COMPLETED | OUTPATIENT
Start: 2025-06-18 | End: 2025-06-18

## 2025-06-18 RX ORDER — MORPHINE SULFATE 4 MG/ML
4 INJECTION, SOLUTION INTRAMUSCULAR; INTRAVENOUS
Refills: 0 | Status: COMPLETED | OUTPATIENT
Start: 2025-06-18 | End: 2025-06-18

## 2025-06-18 RX ORDER — SODIUM CHLORIDE 0.9 % (FLUSH) 0.9 %
10 SYRINGE (ML) INJECTION PRN
Status: DISCONTINUED | OUTPATIENT
Start: 2025-06-18 | End: 2025-06-18 | Stop reason: HOSPADM

## 2025-06-18 RX ORDER — 0.9 % SODIUM CHLORIDE 0.9 %
1000 INTRAVENOUS SOLUTION INTRAVENOUS ONCE
Status: COMPLETED | OUTPATIENT
Start: 2025-06-18 | End: 2025-06-18

## 2025-06-18 RX ORDER — ONDANSETRON 2 MG/ML
4 INJECTION INTRAMUSCULAR; INTRAVENOUS ONCE
Status: COMPLETED | OUTPATIENT
Start: 2025-06-18 | End: 2025-06-18

## 2025-06-18 RX ORDER — ONDANSETRON 4 MG/1
4 TABLET, ORALLY DISINTEGRATING ORAL 3 TIMES DAILY PRN
Qty: 20 TABLET | Refills: 0 | Status: SHIPPED | OUTPATIENT
Start: 2025-06-18

## 2025-06-18 RX ORDER — DICYCLOMINE HYDROCHLORIDE 10 MG/1
10 CAPSULE ORAL EVERY 6 HOURS PRN
Qty: 20 CAPSULE | Refills: 0 | Status: SHIPPED | OUTPATIENT
Start: 2025-06-18

## 2025-06-18 RX ORDER — IOPAMIDOL 755 MG/ML
75 INJECTION, SOLUTION INTRAVASCULAR
Status: COMPLETED | OUTPATIENT
Start: 2025-06-18 | End: 2025-06-18

## 2025-06-18 RX ADMIN — ONDANSETRON 4 MG: 2 INJECTION, SOLUTION INTRAMUSCULAR; INTRAVENOUS at 20:09

## 2025-06-18 RX ADMIN — MORPHINE SULFATE 4 MG: 4 INJECTION, SOLUTION INTRAMUSCULAR; INTRAVENOUS at 20:09

## 2025-06-18 RX ADMIN — SODIUM CHLORIDE, PRESERVATIVE FREE 10 ML: 5 INJECTION INTRAVENOUS at 20:57

## 2025-06-18 RX ADMIN — SODIUM CHLORIDE 100 ML: 9 INJECTION, SOLUTION INTRAVENOUS at 20:57

## 2025-06-18 RX ADMIN — SODIUM CHLORIDE 1000 ML: 0.9 INJECTION, SOLUTION INTRAVENOUS at 20:13

## 2025-06-18 RX ADMIN — IOPAMIDOL 75 ML: 755 INJECTION, SOLUTION INTRAVENOUS at 20:57

## 2025-06-18 ASSESSMENT — ENCOUNTER SYMPTOMS
COLOR CHANGE: 0
NAUSEA: 1
EYE PAIN: 0
SHORTNESS OF BREATH: 0
VOMITING: 1
BACK PAIN: 0
ABDOMINAL PAIN: 1

## 2025-06-18 ASSESSMENT — PAIN SCALES - GENERAL
PAINLEVEL_OUTOF10: 8
PAINLEVEL_OUTOF10: 8

## 2025-06-18 ASSESSMENT — PAIN - FUNCTIONAL ASSESSMENT: PAIN_FUNCTIONAL_ASSESSMENT: 0-10

## 2025-06-18 NOTE — ED NOTES
Mode of arrival (squad #, walk in, police, etc) : walk in         Chief complaint(s): abd pain ,diarrhea         Arrival Note (brief scenario, treatment PTA, etc).:  pt states having a colonoscopy on Monday and has since had abd pain and diarrhea.         C= \"Have you ever felt that you should Cut down on your drinking?\"  No  A= \"Have people Annoyed you by criticizing your drinking?\"  No  G= \"Have you ever felt bad or Guilty about your drinking?\"  No  E= \"Have you ever had a drink as an Eye-opener first thing in the morning to steady your nerves or to help a hangover?\"  No      Deferred []      Reason for deferring: N/A    *If yes to two or more: probable alcohol abuse.*

## 2025-06-18 NOTE — ED PROVIDER NOTES
EMERGENCY DEPARTMENT ENCOUNTER    Pt Name: Misael Bettencourt  MRN: [id number]  Birthdate 1965  Date of evaluation: 6/18/25  CHIEF COMPLAINT       Chief Complaint   Patient presents with    Abdominal Pain    Diarrhea     HISTORY OF PRESENT ILLNESS   59-year-old male presents with complaints of abdominal pain, nausea, diarrhea.  He had a outpatient colonoscopy done earlier this week.  He states since that time he has been having abdominal pain described as an aching occasionally sharp pain in his mostly upper abdomen.  Admits some nausea and states he is also been having loose stools.  Denies fevers or chills chest pain shortness of breath denies any difficulty urinating.    The history is provided by the patient and the spouse.           REVIEW OF SYSTEMS     Review of Systems   Constitutional:  Negative for chills and fever.   HENT:  Negative for congestion and ear pain.    Eyes:  Negative for pain.   Respiratory:  Negative for shortness of breath.    Cardiovascular:  Negative for chest pain, palpitations and leg swelling.   Gastrointestinal:  Positive for abdominal pain, nausea and vomiting.   Genitourinary:  Negative for dysuria and flank pain.   Musculoskeletal:  Negative for back pain.   Skin:  Negative for color change.   Neurological:  Negative for numbness and headaches.   Psychiatric/Behavioral:  Negative for confusion.    All other systems reviewed and are negative.    PASTMEDICAL HISTORY     Past Medical History:   Diagnosis Date    DM2 (diabetes mellitus, type 2) ([facility]) 6/9/2015    Hyperlipidemia     Obesity (BMI 30.0-34.9) 12/8/2015    Poor compliance      Past Problem List  Patient Active Problem List   Diagnosis Code    Mixed hyperlipidemia E78.2    Benign prostatic hyperplasia N40.0    Poor compliance Z91.199    Epigastric pain R10.13    Diverticulosis of colon K57.30    Esophagitis K20.90    Gastritis K29.70    Obesity (BMI 30.0-34.9) E66.811    Type 2 diabetes mellitus without complication,

## 2025-06-19 ENCOUNTER — CLINICAL SUPPORT (OUTPATIENT)
Dept: FAMILY MEDICINE CLINIC | Age: 60
End: 2025-06-19
Payer: COMMERCIAL

## 2025-06-19 VITALS
HEART RATE: 50 BPM | DIASTOLIC BLOOD PRESSURE: 70 MMHG | RESPIRATION RATE: 16 BRPM | OXYGEN SATURATION: 97 % | SYSTOLIC BLOOD PRESSURE: 104 MMHG

## 2025-06-19 DIAGNOSIS — E53.8 VITAMIN B12 DEFICIENCY: Primary | ICD-10-CM

## 2025-06-19 PROCEDURE — 96372 THER/PROPH/DIAG INJ SC/IM: CPT | Performed by: FAMILY MEDICINE

## 2025-06-19 RX ORDER — CYANOCOBALAMIN 1000 UG/ML
1000 INJECTION, SOLUTION INTRAMUSCULAR; SUBCUTANEOUS ONCE
Status: COMPLETED | OUTPATIENT
Start: 2025-06-19 | End: 2025-06-19

## 2025-06-19 RX ADMIN — CYANOCOBALAMIN 1000 MCG: 1000 INJECTION, SOLUTION INTRAMUSCULAR; SUBCUTANEOUS at 16:57

## 2025-06-26 ENCOUNTER — CLINICAL SUPPORT (OUTPATIENT)
Dept: FAMILY MEDICINE CLINIC | Age: 60
End: 2025-06-26
Payer: COMMERCIAL

## 2025-06-26 VITALS — RESPIRATION RATE: 16 BRPM | HEART RATE: 56 BPM | TEMPERATURE: 97.2 F

## 2025-06-26 DIAGNOSIS — E53.8 VITAMIN B12 DEFICIENCY: Primary | ICD-10-CM

## 2025-06-26 PROBLEM — Z12.11 SCREENING FOR COLON CANCER: Status: RESOLVED | Noted: 2025-05-27 | Resolved: 2025-06-26

## 2025-06-26 PROCEDURE — 96372 THER/PROPH/DIAG INJ SC/IM: CPT | Performed by: FAMILY MEDICINE

## 2025-06-26 RX ORDER — CYANOCOBALAMIN 1000 UG/ML
1000 INJECTION, SOLUTION INTRAMUSCULAR; SUBCUTANEOUS ONCE
Status: COMPLETED | OUTPATIENT
Start: 2025-06-26 | End: 2025-06-26

## 2025-06-26 RX ADMIN — CYANOCOBALAMIN 1000 MCG: 1000 INJECTION, SOLUTION INTRAMUSCULAR; SUBCUTANEOUS at 16:52

## 2025-07-03 ENCOUNTER — CLINICAL SUPPORT (OUTPATIENT)
Dept: FAMILY MEDICINE CLINIC | Age: 60
End: 2025-07-03
Payer: COMMERCIAL

## 2025-07-03 DIAGNOSIS — E53.8 VITAMIN B12 DEFICIENCY: Primary | ICD-10-CM

## 2025-07-03 PROCEDURE — 96372 THER/PROPH/DIAG INJ SC/IM: CPT | Performed by: FAMILY MEDICINE

## 2025-07-03 RX ORDER — CYANOCOBALAMIN 1000 UG/ML
1000 INJECTION, SOLUTION INTRAMUSCULAR; SUBCUTANEOUS ONCE
Status: COMPLETED | OUTPATIENT
Start: 2025-07-03 | End: 2025-07-03

## 2025-07-03 RX ADMIN — CYANOCOBALAMIN 1000 MCG: 1000 INJECTION, SOLUTION INTRAMUSCULAR; SUBCUTANEOUS at 10:06

## 2025-07-03 NOTE — PROGRESS NOTES
Patient here today for 3 out of 4 weekly b12 shots. Given in the right deltoid with no complaints.

## 2025-07-10 ENCOUNTER — CLINICAL SUPPORT (OUTPATIENT)
Dept: FAMILY MEDICINE CLINIC | Age: 60
End: 2025-07-10
Payer: COMMERCIAL

## 2025-07-10 DIAGNOSIS — E53.8 VITAMIN B12 DEFICIENCY: Primary | ICD-10-CM

## 2025-07-10 PROCEDURE — 96372 THER/PROPH/DIAG INJ SC/IM: CPT | Performed by: FAMILY MEDICINE

## 2025-07-10 RX ORDER — CYANOCOBALAMIN 1000 UG/ML
1000 INJECTION, SOLUTION INTRAMUSCULAR; SUBCUTANEOUS ONCE
Status: COMPLETED | OUTPATIENT
Start: 2025-07-10 | End: 2025-07-10

## 2025-07-10 RX ADMIN — CYANOCOBALAMIN 1000 MCG: 1000 INJECTION, SOLUTION INTRAMUSCULAR; SUBCUTANEOUS at 16:48

## 2025-07-10 NOTE — PROGRESS NOTES
Patient here today for 4 out of 4 weekly schedule b12 shots. Given today in the right deltoid with no complaints.

## 2025-08-11 ENCOUNTER — CLINICAL SUPPORT (OUTPATIENT)
Dept: FAMILY MEDICINE CLINIC | Age: 60
End: 2025-08-11
Payer: COMMERCIAL

## 2025-08-11 VITALS — OXYGEN SATURATION: 99 % | HEART RATE: 78 BPM | RESPIRATION RATE: 18 BRPM | TEMPERATURE: 97.8 F

## 2025-08-11 DIAGNOSIS — E53.8 VITAMIN B12 DEFICIENCY: Primary | ICD-10-CM

## 2025-08-11 PROCEDURE — 96372 THER/PROPH/DIAG INJ SC/IM: CPT | Performed by: FAMILY MEDICINE

## 2025-08-11 RX ORDER — CYANOCOBALAMIN 1000 UG/ML
1000 INJECTION, SOLUTION INTRAMUSCULAR; SUBCUTANEOUS ONCE
Status: COMPLETED | OUTPATIENT
Start: 2025-08-11 | End: 2025-08-11

## 2025-08-11 RX ADMIN — CYANOCOBALAMIN 1000 MCG: 1000 INJECTION, SOLUTION INTRAMUSCULAR; SUBCUTANEOUS at 16:51

## 2025-08-29 ENCOUNTER — OFFICE VISIT (OUTPATIENT)
Dept: FAMILY MEDICINE CLINIC | Age: 60
End: 2025-08-29
Payer: COMMERCIAL

## 2025-08-29 VITALS
RESPIRATION RATE: 16 BRPM | SYSTOLIC BLOOD PRESSURE: 106 MMHG | DIASTOLIC BLOOD PRESSURE: 60 MMHG | BODY MASS INDEX: 30.54 KG/M2 | OXYGEN SATURATION: 98 % | HEART RATE: 67 BPM | WEIGHT: 167 LBS

## 2025-08-29 DIAGNOSIS — M79.642 LEFT HAND PAIN: ICD-10-CM

## 2025-08-29 DIAGNOSIS — E53.8 VITAMIN B12 DEFICIENCY: ICD-10-CM

## 2025-08-29 DIAGNOSIS — M79.642 LEFT HAND PAIN: Primary | ICD-10-CM

## 2025-08-29 DIAGNOSIS — E78.2 MIXED HYPERLIPIDEMIA: ICD-10-CM

## 2025-08-29 DIAGNOSIS — E11.9 TYPE 2 DIABETES MELLITUS WITHOUT COMPLICATION, WITHOUT LONG-TERM CURRENT USE OF INSULIN (HCC): Primary | ICD-10-CM

## 2025-08-29 LAB — HBA1C MFR BLD: 5.3 %

## 2025-08-29 PROCEDURE — 3044F HG A1C LEVEL LT 7.0%: CPT | Performed by: FAMILY MEDICINE

## 2025-08-29 PROCEDURE — 83036 HEMOGLOBIN GLYCOSYLATED A1C: CPT | Performed by: FAMILY MEDICINE

## 2025-08-29 PROCEDURE — 99214 OFFICE O/P EST MOD 30 MIN: CPT | Performed by: FAMILY MEDICINE

## 2025-08-29 RX ORDER — METFORMIN HYDROCHLORIDE 500 MG/1
500 TABLET, EXTENDED RELEASE ORAL
Qty: 90 TABLET | Refills: 1 | Status: SHIPPED | OUTPATIENT
Start: 2025-08-29

## 2025-08-29 ASSESSMENT — ENCOUNTER SYMPTOMS
BLOOD IN STOOL: 0
CHEST TIGHTNESS: 0
ABDOMINAL PAIN: 0
SHORTNESS OF BREATH: 0

## (undated) DEVICE — PROTECTOR EYE PT SELF ADH NS OPT GRD LF

## (undated) DEVICE — Device

## (undated) DEVICE — SUTURE VCRL + SZ 4-0 L27IN ABSRB WHT FS-2 3/8 CIR REV CUT VCP422H

## (undated) DEVICE — SUTURE VCRL SZ 2-0 L27IN ABSRB UD L26MM CT-2 1/2 CIR J269H

## (undated) DEVICE — 3M™ WARMING BLANKET, UPPER BODY, 10 PER CASE, 42268: Brand: BAIR HUGGER™

## (undated) DEVICE — COOLER THER 32-48IN 6IN ABV PAT 1 SZ FIT MOST CRYO COMB W/

## (undated) DEVICE — 90-S, SUCTION PROBE, NON-BENDABLE, MAX CUT LEVEL 11: Brand: SERFAS ENERGY

## (undated) DEVICE — 3M™ WARMING BLANKET, LOWER BODY, 10 PER CASE, 42568: Brand: BAIR HUGGER™

## (undated) DEVICE — AIRWAY LARYN MASK CHILD SZ 4 30ML CUF FOR 50-70KG PT STD

## (undated) DEVICE — SPONGE LAP W18XL18IN WHT COT 4 PLY FLD STRUNG RADPQ DISP ST

## (undated) DEVICE — DRESSING TRNSPAR W2XL2.75IN FLM SHT SEMIPERMEABLE WIND

## (undated) DEVICE — SUTURE STRATAFIX SYMMETRIC PDS + SZ 1 L18IN ABSRB VLT L48MM SXPP1A400

## (undated) DEVICE — BANDAGE,GAUZE,BULKEE II,4.5"X4.1YD,STRL: Brand: MEDLINE

## (undated) DEVICE — SOLUTION IV IRRIG POUR BRL 0.9% SODIUM CHL 2F7124

## (undated) DEVICE — NO USE 18 MONTHS GOWN STD LG  1153D

## (undated) DEVICE — POUCH INSTR W6.75XL11.5IN FRST 2 PKT ADH FOR ORTH AND

## (undated) DEVICE — [TOMCAT CUTTER, ARTHROSCOPIC SHAVER BLADE,  DO NOT RESTERILIZE,  DO NOT USE IF PACKAGE IS DAMAGED,  KEEP DRY,  KEEP AWAY FROM SUNLIGHT]: Brand: FORMULA

## (undated) DEVICE — [ARTHROSCOPY PUMP,  DO NOT USE IF PACKAGE IS DAMAGED,  KEEP DRY,  KEEP AWAY FROM SUNLIGHT,  PROTECT FROM HEAT AND RADIOACTIVE SOURCES.]: Brand: FLOSTEADY

## (undated) DEVICE — 1200CC GUARDIAN II: Brand: GUARDIAN

## (undated) DEVICE — SUTURE ETHLN SZ 4-0 L18IN NONABSORBABLE BLK L19MM PS-2 3/8 1667H

## (undated) DEVICE — TUBING, SUCTION, 3/16" X 10', STRAIGHT: Brand: MEDLINE

## (undated) DEVICE — GLOVE SURG SZ 8 L12IN FNGR THK13MIL BRN LTX SYN POLYMER W

## (undated) DEVICE — [AGGRESSIVE 6-FLUTE BARREL BUR, ARTHROSCOPIC SHAVER BLADE,  DO NOT RESTERILIZE,  DO NOT USE IF PACKAGE IS DAMAGED,  KEEP DRY,  KEEP AWAY FROM SUNLIGHT]: Brand: FORMULA

## (undated) DEVICE — 4-PORT MANIFOLD: Brand: NEPTUNE 2

## (undated) DEVICE — SKIN AFFIX SURG ADHESIVE 72/CS 0.55ML: Brand: MEDLINE

## (undated) DEVICE — GOWN,SIRUS,NONRNF,SETINSLV,XL,20/CS: Brand: MEDLINE

## (undated) DEVICE — SOLUTION IV IRRIG LACTATED RINGERS 3000ML 2B7487

## (undated) DEVICE — SUTURE STRATAFIX SPRL MCRYL + SZ 2 0 L27IN ABSRB UD W NDL SXMP1B419

## (undated) DEVICE — DRESSING,GAUZE,XEROFORM,CURAD,1"X8",ST: Brand: CURAD

## (undated) DEVICE — YANKAUER,POOLE TIP,STERILE,50/CS: Brand: MEDLINE

## (undated) DEVICE — SOLUTION IV IRRIG WATER 1000ML POUR BRL 2F7114

## (undated) DEVICE — GOWN,POLY REINFORCED,LG: Brand: MEDLINE

## (undated) DEVICE — STANDARD HYPODERMIC NEEDLE,POLYPROPYLENE HUB: Brand: MONOJECT

## (undated) DEVICE — ENDOSCOPIC KIT 1.1+ 10 FT OP4 CA DE

## (undated) DEVICE — MEDI-VAC YANKAUER SUCTION HANDLE W/BULBOUS TIP: Brand: CARDINAL HEALTH

## (undated) DEVICE — [STANDARD 12-FLUTE BARREL BUR, ARTHROSCOPIC SHAVER BLADE,  DO NOT RESTERILIZE,  DO NOT USE IF PACKAGE IS DAMAGED,  KEEP DRY,  KEEP AWAY FROM SUNLIGHT]: Brand: FORMULA

## (undated) DEVICE — SINGLE USE AIR/WATER, SUCTION AND BIOPSY VALVES SET: Brand: ORCAPOD™

## (undated) DEVICE — GAUZE,SPONGE,4"X4",16PLY,STRL,LF,10/TRAY: Brand: MEDLINE

## (undated) DEVICE — IMMOBILIZER SHLDR L10.5-17IN D7IN SLNG W/ 15DEG ABD PLLW

## (undated) DEVICE — SWABSTICK MEDICATED 10% POVIDONE IOD PVP SGL ANTISEP SAT

## (undated) DEVICE — SPONGE GZ W4XL4IN RAYON POLY FILL CVR W/ NONWOVEN FAB

## (undated) DEVICE — ENCORE® LATEX ORTHO SIZE 8, STERILE LATEX POWDER-FREE SURGICAL GLOVE: Brand: ENCORE

## (undated) DEVICE — DRESSING PETRO W3XL3IN OIL EMUL N ADH GZ KNIT IMPREG CELOS

## (undated) DEVICE — GAUZE,SPONGE,FLUFF,6"X6.75",STRL,5/TRAY: Brand: MEDLINE